# Patient Record
Sex: MALE | Race: WHITE | NOT HISPANIC OR LATINO | Employment: UNEMPLOYED | URBAN - METROPOLITAN AREA
[De-identification: names, ages, dates, MRNs, and addresses within clinical notes are randomized per-mention and may not be internally consistent; named-entity substitution may affect disease eponyms.]

---

## 2021-06-29 NOTE — TELEPHONE ENCOUNTER
Patient requesting Zolpidem 12 5 mg  States last time he had it was 5/26/21  States unable to sleep well for last 3-4 days  Will like request call back at number rakel

## 2021-12-03 ENCOUNTER — OFFICE VISIT (OUTPATIENT)
Dept: FAMILY MEDICINE CLINIC | Facility: CLINIC | Age: 36
End: 2021-12-03
Payer: COMMERCIAL

## 2021-12-03 VITALS
BODY MASS INDEX: 27.4 KG/M2 | DIASTOLIC BLOOD PRESSURE: 80 MMHG | HEART RATE: 115 BPM | SYSTOLIC BLOOD PRESSURE: 124 MMHG | TEMPERATURE: 97.6 F | OXYGEN SATURATION: 95 % | HEIGHT: 69 IN | WEIGHT: 185 LBS

## 2021-12-03 DIAGNOSIS — Z11.4 ENCOUNTER FOR SCREENING FOR HIV: ICD-10-CM

## 2021-12-03 DIAGNOSIS — F79 INTELLECTUAL DISABILITY: Primary | ICD-10-CM

## 2021-12-03 DIAGNOSIS — G47.9 SLEEP DISTURBANCE: ICD-10-CM

## 2021-12-03 DIAGNOSIS — F32.A DEPRESSION, UNSPECIFIED DEPRESSION TYPE: ICD-10-CM

## 2021-12-03 DIAGNOSIS — Z23 ENCOUNTER FOR IMMUNIZATION: ICD-10-CM

## 2021-12-03 PROCEDURE — 3008F BODY MASS INDEX DOCD: CPT | Performed by: FAMILY MEDICINE

## 2021-12-03 PROCEDURE — 1036F TOBACCO NON-USER: CPT | Performed by: FAMILY MEDICINE

## 2021-12-03 PROCEDURE — 90686 IIV4 VACC NO PRSV 0.5 ML IM: CPT | Performed by: FAMILY MEDICINE

## 2021-12-03 PROCEDURE — G0008 ADMIN INFLUENZA VIRUS VAC: HCPCS | Performed by: FAMILY MEDICINE

## 2021-12-03 PROCEDURE — 99214 OFFICE O/P EST MOD 30 MIN: CPT | Performed by: FAMILY MEDICINE

## 2021-12-03 PROCEDURE — 3725F SCREEN DEPRESSION PERFORMED: CPT | Performed by: FAMILY MEDICINE

## 2021-12-03 RX ORDER — ZOLPIDEM TARTRATE 10 MG/1
TABLET ORAL
COMMUNITY
Start: 2021-10-25 | End: 2021-12-03 | Stop reason: SDUPTHER

## 2021-12-03 RX ORDER — CITALOPRAM 20 MG/1
20 TABLET ORAL DAILY
Qty: 30 TABLET | Refills: 5 | Status: SHIPPED | OUTPATIENT
Start: 2021-12-03

## 2021-12-03 RX ORDER — ZOLPIDEM TARTRATE 10 MG/1
10 TABLET ORAL
Qty: 30 TABLET | Refills: 0 | Status: SHIPPED | OUTPATIENT
Start: 2021-12-03 | End: 2022-01-03 | Stop reason: SDUPTHER

## 2021-12-03 RX ORDER — ZOLPIDEM TARTRATE 10 MG/1
TABLET ORAL
Qty: 30 TABLET | Status: CANCELLED | OUTPATIENT
Start: 2021-12-03

## 2021-12-03 RX ORDER — CITALOPRAM 20 MG/1
20 TABLET ORAL DAILY
COMMUNITY
Start: 2021-11-29 | End: 2021-12-03 | Stop reason: SDUPTHER

## 2021-12-07 ENCOUNTER — APPOINTMENT (OUTPATIENT)
Dept: LAB | Facility: CLINIC | Age: 36
End: 2021-12-07
Payer: COMMERCIAL

## 2021-12-07 DIAGNOSIS — F79 INTELLECTUAL DISABILITY: ICD-10-CM

## 2021-12-07 DIAGNOSIS — G47.9 SLEEP DISTURBANCE: ICD-10-CM

## 2021-12-07 DIAGNOSIS — Z11.4 ENCOUNTER FOR SCREENING FOR HIV: ICD-10-CM

## 2021-12-07 DIAGNOSIS — F32.A DEPRESSION, UNSPECIFIED DEPRESSION TYPE: ICD-10-CM

## 2021-12-07 LAB
ALBUMIN SERPL BCP-MCNC: 4 G/DL (ref 3.5–5)
ALP SERPL-CCNC: 81 U/L (ref 46–116)
ALT SERPL W P-5'-P-CCNC: 33 U/L (ref 12–78)
ANION GAP SERPL CALCULATED.3IONS-SCNC: 7 MMOL/L (ref 4–13)
AST SERPL W P-5'-P-CCNC: 9 U/L (ref 5–45)
BASOPHILS # BLD AUTO: 0.07 THOUSANDS/ΜL (ref 0–0.1)
BASOPHILS NFR BLD AUTO: 1 % (ref 0–1)
BILIRUB SERPL-MCNC: 0.45 MG/DL (ref 0.2–1)
BUN SERPL-MCNC: 13 MG/DL (ref 5–25)
CALCIUM SERPL-MCNC: 9.8 MG/DL (ref 8.3–10.1)
CHLORIDE SERPL-SCNC: 105 MMOL/L (ref 100–108)
CHOLEST SERPL-MCNC: 250 MG/DL
CO2 SERPL-SCNC: 25 MMOL/L (ref 21–32)
CREAT SERPL-MCNC: 1.14 MG/DL (ref 0.6–1.3)
EOSINOPHIL # BLD AUTO: 0.05 THOUSAND/ΜL (ref 0–0.61)
EOSINOPHIL NFR BLD AUTO: 1 % (ref 0–6)
GFR SERPL CREATININE-BSD FRML MDRD: 82 ML/MIN/1.73SQ M
GLUCOSE P FAST SERPL-MCNC: 70 MG/DL (ref 65–99)
HCT VFR BLD AUTO: 47.9 % (ref 36.5–49.3)
HDLC SERPL-MCNC: 32 MG/DL
HGB BLD-MCNC: 15.6 G/DL (ref 12–17)
LDLC SERPL CALC-MCNC: 174 MG/DL (ref 0–100)
LYMPHOCYTES # BLD AUTO: 1.08 THOUSANDS/ΜL (ref 0.6–4.47)
LYMPHOCYTES NFR BLD AUTO: 17 % (ref 14–44)
MCH RBC QN AUTO: 29.1 PG (ref 26.8–34.3)
MCHC RBC AUTO-ENTMCNC: 32.6 G/DL (ref 31.4–37.4)
MCV RBC AUTO: 89 FL (ref 82–98)
MONOCYTES # BLD AUTO: 0.51 THOUSAND/ΜL (ref 0.17–1.22)
MONOCYTES NFR BLD AUTO: 8 % (ref 4–12)
NEUTROPHILS # BLD AUTO: 4.5 THOUSANDS/ΜL (ref 1.85–7.62)
NEUTS SEG NFR BLD AUTO: 72 % (ref 43–75)
NONHDLC SERPL-MCNC: 218 MG/DL
PLATELET # BLD AUTO: 323 THOUSANDS/UL (ref 149–390)
POTASSIUM SERPL-SCNC: 3.9 MMOL/L (ref 3.5–5.3)
PROT SERPL-MCNC: 8.1 G/DL (ref 6.4–8.2)
RBC # BLD AUTO: 5.36 MILLION/UL (ref 3.88–5.62)
SODIUM SERPL-SCNC: 137 MMOL/L (ref 136–145)
TRIGL SERPL-MCNC: 221 MG/DL
TSH SERPL DL<=0.05 MIU/L-ACNC: 1.13 UIU/ML (ref 0.36–3.74)
WBC # BLD AUTO: 6.23 THOUSAND/UL (ref 4.31–10.16)

## 2021-12-07 PROCEDURE — 80053 COMPREHEN METABOLIC PANEL: CPT

## 2021-12-07 PROCEDURE — 85025 COMPLETE CBC W/AUTO DIFF WBC: CPT

## 2021-12-07 PROCEDURE — 87389 HIV-1 AG W/HIV-1&-2 AB AG IA: CPT

## 2021-12-07 PROCEDURE — 84443 ASSAY THYROID STIM HORMONE: CPT

## 2021-12-07 PROCEDURE — 80061 LIPID PANEL: CPT

## 2021-12-07 PROCEDURE — 36415 COLL VENOUS BLD VENIPUNCTURE: CPT

## 2021-12-08 LAB — HIV 1+2 AB+HIV1 P24 AG SERPL QL IA: NORMAL

## 2022-01-03 ENCOUNTER — OFFICE VISIT (OUTPATIENT)
Dept: FAMILY MEDICINE CLINIC | Facility: CLINIC | Age: 37
End: 2022-01-03
Payer: MEDICARE

## 2022-01-03 VITALS
SYSTOLIC BLOOD PRESSURE: 128 MMHG | OXYGEN SATURATION: 97 % | HEIGHT: 70 IN | DIASTOLIC BLOOD PRESSURE: 80 MMHG | TEMPERATURE: 98.9 F | HEART RATE: 110 BPM | WEIGHT: 191.4 LBS | BODY MASS INDEX: 27.4 KG/M2

## 2022-01-03 DIAGNOSIS — G47.9 SLEEP DISTURBANCE: ICD-10-CM

## 2022-01-03 DIAGNOSIS — E78.2 MODERATE MIXED HYPERLIPIDEMIA NOT REQUIRING STATIN THERAPY: ICD-10-CM

## 2022-01-03 DIAGNOSIS — K21.9 GASTROESOPHAGEAL REFLUX DISEASE, UNSPECIFIED WHETHER ESOPHAGITIS PRESENT: Primary | ICD-10-CM

## 2022-01-03 PROBLEM — E78.5 HYPERLIPEMIA: Status: ACTIVE | Noted: 2022-01-03

## 2022-01-03 PROCEDURE — 3008F BODY MASS INDEX DOCD: CPT | Performed by: FAMILY MEDICINE

## 2022-01-03 PROCEDURE — 99213 OFFICE O/P EST LOW 20 MIN: CPT | Performed by: FAMILY MEDICINE

## 2022-01-03 RX ORDER — ZOLPIDEM TARTRATE 10 MG/1
10 TABLET ORAL
Qty: 30 TABLET | Refills: 0 | Status: SHIPPED | OUTPATIENT
Start: 2022-01-03 | End: 2022-01-31 | Stop reason: SDUPTHER

## 2022-01-03 RX ORDER — OMEPRAZOLE 20 MG/1
20 CAPSULE, DELAYED RELEASE ORAL
Qty: 90 CAPSULE | Refills: 3 | Status: SHIPPED | OUTPATIENT
Start: 2022-01-03

## 2022-01-03 NOTE — PROGRESS NOTES
Assessment/Plan:   1  Gastroesophageal reflux disease, unspecified whether esophagitis present  Assessment & Plan:  Start patient on omeprazole 20 mg daily  Discussed decreasing caffeine and acidic food intake  Reviewed food journal with patient  Orders:  -     omeprazole (PriLOSEC) 20 mg delayed release capsule; Take 1 capsule (20 mg total) by mouth daily before breakfast    2  Moderate mixed hyperlipidemia not requiring statin therapy  Assessment & Plan:  Elevated LDL and Triglycerides  Repeat Lipid panel in 6 months   Reviewed Lifestyle modifications with patient  3  Sleep disturbance  Assessment & Plan:  Chronic sleep disturbance  Discussed sleep hygiene  Has been on Ambien for 15 years  Controlled Substance [place]  [place] or [place]  reviewed: No red flags were identified; safe to proceed with prescription             Nutrition Assessment and Intervention:     Reviewed food recall journal      Physical Activity Assessment and Intervention:    Activity journal reviewed      Emotional and Mental Well-being, Sleep, Connectedness Assessment and Intervention:    Counseled regarding sleep hygiene and aspects of healthy sleep      Therapeutic Lifestyle Change Visit:     One-on-one comprehensive counseling, coaching, and health behavior change visit completed              Return in about 8 weeks (around 2/28/2022) for Recheck  Subjective:    PAM Ayon is a 39 y o  male who presents with:        ---Above per clinical staff & reviewed  ---      Patient reports indigestion when eating tomato sauce do a dot candy as well  Notices the most add because nighttime  Has tried baking soda but this has not helped  Feels pressure in his chest when lying down  Denies any other chest pain  Denies sore throat or cough  Denies diarrhea nausea, vomiting  No blood in his stool    Otherwise doing well      The following portions of the patient's history were reviewed and updated as appropriate: allergies, current medications, past family history, past medical history, past social history, past surgical history and problem list     Review of Systems   Constitutional: Negative for appetite change, fatigue and fever  Indigestion      HENT: Negative for sore throat  Eyes: Negative for visual disturbance  Respiratory: Negative for cough, chest tightness and shortness of breath  Cardiovascular: Negative for chest pain, palpitations and leg swelling  Gastrointestinal: Negative for abdominal pain, anal bleeding, blood in stool, constipation, diarrhea and nausea  Endocrine: Negative for cold intolerance and heat intolerance  Genitourinary: Negative for flank pain  Musculoskeletal: Negative for back pain and neck pain  Skin: Negative for rash  Neurological: Negative for headaches  Psychiatric/Behavioral: Negative for behavioral problems and confusion  Objective:    /80 (BP Location: Left arm, Patient Position: Sitting, Cuff Size: Large)   Pulse (!) 110   Temp 98 9 °F (37 2 °C)   Ht 5' 10" (1 778 m)   Wt 86 8 kg (191 lb 6 4 oz)   SpO2 97%   BMI 27 46 kg/m²   Wt Readings from Last 3 Encounters:   01/03/22 86 8 kg (191 lb 6 4 oz)   12/03/21 83 9 kg (185 lb)     BP Readings from Last 3 Encounters:   01/03/22 128/80   12/03/21 124/80     Current Outpatient Medications   Medication Sig Dispense Refill    citalopram (CeleXA) 20 mg tablet Take 1 tablet (20 mg total) by mouth daily 30 tablet 5    zolpidem (AMBIEN) 10 mg tablet Take 1 tablet (10 mg total) by mouth daily at bedtime as needed for sleep 30 tablet 0     No current facility-administered medications for this visit  Physical Exam  Vitals and nursing note reviewed  Constitutional:       Appearance: He is well-developed  HENT:      Head: Normocephalic and atraumatic  Nose: Nose normal    Eyes:      Extraocular Movements: Extraocular movements intact  Pupils: Pupils are equal, round, and reactive to light  Cardiovascular:      Rate and Rhythm: Normal rate and regular rhythm  Heart sounds: Normal heart sounds  No murmur heard  Pulmonary:      Effort: Pulmonary effort is normal       Breath sounds: Normal breath sounds  Abdominal:      General: Bowel sounds are normal       Palpations: Abdomen is soft  Musculoskeletal:         General: Normal range of motion  Cervical back: Normal range of motion and neck supple  Skin:     General: Skin is warm and dry  Neurological:      Mental Status: He is alert and oriented to person, place, and time     Psychiatric:         Behavior: Behavior normal

## 2022-01-03 NOTE — PATIENT INSTRUCTIONS
GERD (Gastroesophageal Reflux Disease)   WHAT YOU NEED TO KNOW:   Gastroesophageal reflux disease (GERD) is reflux that occurs more than twice a week for a few weeks  Reflux means acid and food in the stomach back up into the esophagus  It usually causes heartburn and other symptoms  GERD can cause other health problems over time if it is not treated  DISCHARGE INSTRUCTIONS:   Call your local emergency number (911 in the [address] [facility]) if:   · You have severe chest pain and sudden trouble breathing  Return to the emergency department if:   · You have trouble breathing after you vomit  · You have trouble swallowing, or pain with swallowing  · Your bowel movements are black, bloody, or tarry-looking  · Your vomit looks like coffee grounds or has blood in it  Call your doctor or gastroenterologist if:   · You feel full and cannot burp or vomit  · You vomit large amounts, or you vomit often  · You are losing weight without trying  · Your symptoms get worse or do not improve with treatment  · You have questions or concerns about your condition or care  Medicines:   · Medicines  are used to decrease stomach acid  Medicine may also be used to help your lower esophageal sphincter and stomach contract (tighten) more  · Take your medicine as directed  Contact your healthcare provider if you think your medicine is not helping or if you have side effects  Tell him of her if you are allergic to any medicine  Keep a list of the medicines, vitamins, and herbs you take  Include the amounts, and when and why you take them  Bring the list or the pill bottles to follow-up visits  Carry your medicine list with you in case of an emergency  Manage GERD:       · Do not have foods or drinks that may increase heartburn  These include chocolate, peppermint, fried or fatty foods, drinks that contain caffeine, or carbonated drinks (soda)   Other foods include spicy foods, onions, tomatoes, and tomato-based foods  Do not have foods or drinks that can irritate your esophagus, such as citrus fruits, juices, and alcohol  · Do not eat large meals  When you eat a lot of food at one time, your stomach needs more acid to digest it  Eat 6 small meals each day instead of 3 large ones, and eat slowly  Do not eat meals 2 to 3 hours before bedtime  · Elevate the head of your bed  Place 6-inch blocks under the head of your bed frame  You may also use more than one pillow under your head and shoulders while you sleep  · Maintain a healthy weight  If you are overweight, weight loss may help relieve symptoms of GERD  · Do not smoke  Smoking weakens the lower esophageal sphincter and increases the risk of GERD  Ask your healthcare provider for information if you currently smoke and need help to quit  E-cigarettes or smokeless tobacco still contain nicotine  Talk to your healthcare provider before you use these products  · Do not wear clothing that is tight around your waist   Tight clothing can put pressure on your stomach and cause or worsen GERD symptoms  Follow up with your doctor or gastroenterologist as directed:  Write down your questions so you remember to ask them during your visits  © Copyright [employer] 2021 Information is for End User's use only and may not be sold, redistributed or otherwise used for commercial purposes  All illustrations and images included in CareNotes® are the copyrighted property of [employer]  or Juliane Cruz  The above information is an  only  It is not intended as medical advice for individual conditions or treatments  Talk to your doctor, nurse or pharmacist before following any medical regimen to see if it is safe and effective for you

## 2022-01-03 NOTE — ASSESSMENT & PLAN NOTE
Elevated LDL and Triglycerides  Repeat Lipid panel in 6 months   Reviewed Lifestyle modifications with patient

## 2022-01-03 NOTE — ASSESSMENT & PLAN NOTE
Start patient on omeprazole 20 mg daily  Discussed decreasing caffeine and acidic food intake  Reviewed food journal with patient

## 2022-01-31 DIAGNOSIS — G47.9 SLEEP DISTURBANCE: ICD-10-CM

## 2022-02-02 RX ORDER — ZOLPIDEM TARTRATE 10 MG/1
10 TABLET ORAL
Qty: 30 TABLET | Refills: 0 | Status: SHIPPED | OUTPATIENT
Start: 2022-02-02 | End: 2022-03-01 | Stop reason: SDUPTHER

## 2022-03-01 DIAGNOSIS — G47.9 SLEEP DISTURBANCE: ICD-10-CM

## 2022-03-01 NOTE — TELEPHONE ENCOUNTER
Patient requesting refill on the attached medication  Patient only has 4 pills left  Please advise  Thank you

## 2022-03-03 RX ORDER — ZOLPIDEM TARTRATE 10 MG/1
10 TABLET ORAL
Qty: 30 TABLET | Refills: 0 | Status: SHIPPED | OUTPATIENT
Start: 2022-03-03 | End: 2022-04-05

## 2022-03-11 ENCOUNTER — OFFICE VISIT (OUTPATIENT)
Dept: FAMILY MEDICINE CLINIC | Facility: CLINIC | Age: 37
End: 2022-03-11
Payer: MEDICARE

## 2022-03-11 VITALS
SYSTOLIC BLOOD PRESSURE: 120 MMHG | HEART RATE: 100 BPM | TEMPERATURE: 97.9 F | RESPIRATION RATE: 20 BRPM | OXYGEN SATURATION: 98 % | BODY MASS INDEX: 26.76 KG/M2 | DIASTOLIC BLOOD PRESSURE: 84 MMHG | WEIGHT: 186.5 LBS

## 2022-03-11 DIAGNOSIS — K21.9 GASTROESOPHAGEAL REFLUX DISEASE, UNSPECIFIED WHETHER ESOPHAGITIS PRESENT: Primary | ICD-10-CM

## 2022-03-11 PROCEDURE — 99213 OFFICE O/P EST LOW 20 MIN: CPT | Performed by: FAMILY MEDICINE

## 2022-03-11 NOTE — ASSESSMENT & PLAN NOTE
Continue decreasing caffeine and acidic foods  Trial off omeprazole  Will call office if symptoms return

## 2022-03-11 NOTE — PROGRESS NOTES
Assessment/Plan:    Gastroesophageal reflux disease  Continue decreasing caffeine and acidic foods  Trial off omeprazole  Will call office if symptoms return  Sleep disturbance  Chronic sleep disturbance  Discussed sleep hygiene  Has been on Ambien for 15 years  Cont[place] Sub[place]  [place]  reviewed: No red flags were identified; safe to proceed with prescription            Problem List Items Addressed This Visit        Digestive    Gastroesophageal reflux disease - Primary     Continue decreasing caffeine and acidic foods  Trial off omeprazole  Will call office if symptoms return  follow-up as needed  Subjective:      Patient ID: Marybeth Delarosa is a 39 y o  male  Presents for follow-up today for reflux  Tolerating omeprazole  He has also started changing his diet  Reports cutting out soda and switching to water  Has also started making dietary changes  Increasing his fruit and vegetables  Has not experienced any acid reflux recently  Has also lost 5 lb over the last 3 months  HPI    The following portions of the patient's history were reviewed and updated as appropriate: allergies, current medications, past family history, past medical history, past social history, past surgical history and problem list     Review of Systems   Constitutional: Negative for fatigue and fever  HENT: Negative for sore throat  Eyes: Negative for visual disturbance  Respiratory: Negative for cough, chest tightness and shortness of breath  Cardiovascular: Negative for chest pain, palpitations and leg swelling  Gastrointestinal: Negative for abdominal distention, abdominal pain, anal bleeding, blood in stool, constipation, diarrhea, nausea and vomiting  Endocrine: Negative for cold intolerance and heat intolerance  Genitourinary: Negative for flank pain  Musculoskeletal: Negative for back pain and neck pain  Skin: Negative for rash     Neurological: Negative for headaches  Psychiatric/Behavioral: Negative for behavioral problems  Objective:      /84 (BP Location: Left arm, Patient Position: Sitting, Cuff Size: Adult)   Pulse 100   Temp 97 9 °F (36 6 °C) (Temporal)   Resp 20   Wt 84 6 kg (186 lb 8 oz)   SpO2 98%   BMI 26 76 kg/m²          Physical Exam  Vitals and nursing note reviewed  Constitutional:       Appearance: He is well-developed  HENT:      Head: Normocephalic and atraumatic  Nose: Nose normal    Eyes:      Extraocular Movements: Extraocular movements intact  Pupils: Pupils are equal, round, and reactive to light  Cardiovascular:      Rate and Rhythm: Normal rate and regular rhythm  Heart sounds: Normal heart sounds  No murmur heard  Pulmonary:      Effort: Pulmonary effort is normal       Breath sounds: Normal breath sounds  Abdominal:      General: Bowel sounds are normal       Palpations: Abdomen is soft  Musculoskeletal:         General: Normal range of motion  Cervical back: Normal range of motion and neck supple  Skin:     General: Skin is warm and dry  Neurological:      Mental Status: He is alert and oriented to person, place, and time     Psychiatric:         Behavior: Behavior normal

## 2022-03-11 NOTE — ASSESSMENT & PLAN NOTE
Chronic sleep disturbance  Discussed sleep hygiene  Has been on Ambien for 15 years  Cont[place]  [place]  reviewed: No red flags were identified; safe to proceed with prescription  Rosina Carroll

## 2022-03-11 NOTE — PATIENT INSTRUCTIONS
GERD (Gastroesophageal Reflux Disease)   WHAT YOU NEED TO KNOW:   Gastroesophageal reflux disease (GERD) is reflux that happens more than 2 times a week for a few weeks  Reflux means acid and food in your stomach back up into your esophagus  GERD can cause other health problems over time if it is not treated  DISCHARGE INSTRUCTIONS:   Call your local emergency number (911 in the [address] [facility]) if:   · You have severe chest pain and sudden trouble breathing  Return to the emergency department if:   · You have trouble breathing after you vomit  · You have trouble swallowing, or pain with swallowing  · Your bowel movements are black, bloody, or tarry-looking  · Your vomit looks like coffee grounds or has blood in it  Call your doctor or gastroenterologist if:   · You feel full and cannot burp or vomit  · You vomit large amounts, or you vomit often  · You are losing weight without trying  · Your symptoms get worse or do not improve with treatment  · You have questions or concerns about your condition or care  Medicines:   · Medicines  are used to decrease stomach acid  Medicine may also be used to help your lower esophageal sphincter and stomach contract (tighten) more  · Take your medicine as directed  Contact your healthcare provider if you think your medicine is not helping or if you have side effects  Tell him of her if you are allergic to any medicine  Keep a list of the medicines, vitamins, and herbs you take  Include the amounts, and when and why you take them  Bring the list or the pill bottles to follow-up visits  Carry your medicine list with you in case of an emergency  Manage GERD:       · Do not have foods or drinks that may increase heartburn  These include chocolate, peppermint, fried or fatty foods, drinks that contain caffeine, or carbonated drinks (soda)  Other foods include spicy foods, onions, tomatoes, and tomato-based foods   Do not have foods or drinks that can irritate your esophagus, such as citrus fruits, juices, and alcohol  · Do not eat large meals  When you eat a lot of food at one time, your stomach needs more acid to digest it  Eat 6 small meals each day instead of 3 large ones, and eat slowly  Do not eat meals 2 to 3 hours before bedtime  · Elevate the head of your bed  Place 6-inch blocks under the head of your bed frame  You may also use more than one pillow under your head and shoulders while you sleep  · Maintain a healthy weight  If you are overweight, weight loss may help relieve symptoms of GERD  · Do not smoke  Smoking weakens the lower esophageal sphincter and increases the risk of GERD  Ask your healthcare provider for information if you currently smoke and need help to quit  E-cigarettes or smokeless tobacco still contain nicotine  Talk to your healthcare provider before you use these products  · Do not put pressure on your abdomen  Pressure pushes acid up into your esophagus  Do not wear clothing that is tight around your waist  Do not bend over  Bend at the knees if you need to pick something up  Follow up with your doctor or gastroenterologist as directed:  Write down your questions so you remember to ask them during your visits  © Copyright [employer] 2022 Information is for End User's use only and may not be sold, redistributed or otherwise used for commercial purposes  All illustrations and images included in CareNotes® are the copyrighted property of [employer]  or Juliane Cruz  The above information is an  only  It is not intended as medical advice for individual conditions or treatments  Talk to your doctor, nurse or pharmacist before following any medical regimen to see if it is safe and effective for you

## 2022-04-02 DIAGNOSIS — G47.9 SLEEP DISTURBANCE: ICD-10-CM

## 2022-04-04 ENCOUNTER — TELEPHONE (OUTPATIENT)
Dept: FAMILY MEDICINE CLINIC | Facility: CLINIC | Age: 37
End: 2022-04-04

## 2022-04-04 DIAGNOSIS — G47.9 SLEEP DISTURBANCE: ICD-10-CM

## 2022-04-04 NOTE — TELEPHONE ENCOUNTER
Patient was calling to follow up on a health call request for med refill of his zolpidiem  He is out of the med and is hoping he can get a refill today   Thank you

## 2022-04-05 RX ORDER — ZOLPIDEM TARTRATE 10 MG/1
TABLET ORAL
Qty: 30 TABLET | Refills: 2 | Status: SHIPPED | OUTPATIENT
Start: 2022-04-05 | End: 2022-06-29 | Stop reason: SDUPTHER

## 2022-04-05 RX ORDER — ZOLPIDEM TARTRATE 10 MG/1
10 TABLET ORAL
Qty: 30 TABLET | Refills: 0 | OUTPATIENT
Start: 2022-04-05

## 2022-06-29 DIAGNOSIS — G47.9 SLEEP DISTURBANCE: ICD-10-CM

## 2022-06-30 RX ORDER — ZOLPIDEM TARTRATE 10 MG/1
10 TABLET ORAL
Qty: 30 TABLET | Refills: 2 | Status: SHIPPED | OUTPATIENT
Start: 2022-06-30

## 2022-08-21 DIAGNOSIS — F32.A DEPRESSION, UNSPECIFIED DEPRESSION TYPE: ICD-10-CM

## 2022-08-22 RX ORDER — CITALOPRAM 20 MG/1
TABLET ORAL
Qty: 30 TABLET | Refills: 5 | Status: SHIPPED | OUTPATIENT
Start: 2022-08-22

## 2022-09-23 DIAGNOSIS — G47.9 SLEEP DISTURBANCE: ICD-10-CM

## 2022-09-26 RX ORDER — ZOLPIDEM TARTRATE 10 MG/1
10 TABLET ORAL
Qty: 30 TABLET | Refills: 2 | Status: SHIPPED | OUTPATIENT
Start: 2022-09-26

## 2023-01-20 ENCOUNTER — TELEPHONE (OUTPATIENT)
Dept: FAMILY MEDICINE CLINIC | Facility: CLINIC | Age: 38
End: 2023-01-20

## 2023-01-24 DIAGNOSIS — G47.9 SLEEP DISTURBANCE: Primary | ICD-10-CM

## 2023-01-24 RX ORDER — ZOLPIDEM TARTRATE 10 MG/1
10 TABLET ORAL
Qty: 30 TABLET | Refills: 2 | Status: CANCELLED | OUTPATIENT
Start: 2023-01-24

## 2023-02-20 ENCOUNTER — TELEPHONE (OUTPATIENT)
Dept: FAMILY MEDICINE CLINIC | Facility: CLINIC | Age: 38
End: 2023-02-20

## 2023-02-28 ENCOUNTER — OFFICE VISIT (OUTPATIENT)
Dept: FAMILY MEDICINE CLINIC | Facility: CLINIC | Age: 38
End: 2023-02-28

## 2023-02-28 VITALS
TEMPERATURE: 98.6 F | HEART RATE: 100 BPM | BODY MASS INDEX: 26.69 KG/M2 | DIASTOLIC BLOOD PRESSURE: 64 MMHG | WEIGHT: 180.2 LBS | HEIGHT: 69 IN | SYSTOLIC BLOOD PRESSURE: 102 MMHG | OXYGEN SATURATION: 98 %

## 2023-02-28 DIAGNOSIS — F79 INTELLECTUAL DISABILITY: Primary | ICD-10-CM

## 2023-02-28 DIAGNOSIS — G47.9 SLEEP DISTURBANCE: ICD-10-CM

## 2023-02-28 NOTE — PROGRESS NOTES
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, and friends  Likes to fish  Has not had any other vocational training since high school  Bills/payments are made by parents and siblings are close to him/in his area  Reports good family/social support  No difficulties with transportation   Recommended obtaining previous medical records in order to update his chart  Patient will follow up on his records  Will continue to monitor for any social deficits/need for social work referral   Cheryl Lombardi at Each office visit  Relevant Medications    zolpidem (AMBIEN) 10 mg tablet (Start on 3/6/2023)    Sleep disturbance     History of chronic sleep disturbance  Has been on long-term use of Zolpidem - since childhood  Patient reported that he has been placed on this current regimen after multiple other drugs failed to produced sleep/non-interrupted sleep  Denied any side effects  PDMP reviewed - no concerns  Discussed referral to behavioral health/psychiatry but patient refused  Will continue his refill and will continue to advocate for therapy/psychiatry referral           Relevant Medications    zolpidem (AMBIEN) 10 mg tablet (Start on 3/6/2023)     BMI Counseling: Body mass index is 26 61 kg/m²  The BMI is above normal  Nutrition recommendations include decreasing portion sizes, encouraging healthy choices of fruits and vegetables, decreasing fast food intake, consuming healthier snacks, limiting drinks that contain sugar, moderation in carbohydrate intake, increasing intake of lean protein, reducing intake of saturated and trans fat and reducing intake of cholesterol  Exercise recommendations include moderate physical activity 150 minutes/week, exercising 3-5 times per week and obtaining a gym membership   No pharmacotherapy was ordered  Rationale for BMI follow-up plan is due to patient being overweight or obese  Preventive health issues were discussed with patient, and age appropriate screening tests were ordered as noted in patient's After Visit Summary  Personalized health advice and appropriate referrals for health education or preventive services given if needed, as noted in patient's After Visit Summary  History of Present Illness:     Patient presents for a Medicare Wellness Visit    40-year-old male patient presents to the clinic for his annual physical   Denies any symptoms today  Reports needing refills on his Zolpidem  Denies any side effects from the medication  Reports a past medical history of unspecified learning/intellectual disability with associated sleep disturbance  Reports sleeping schedule as going to bed at 07:30/08:00 PM and will wake up at 02:30/03:00  Says when he wakes up at that time he is able to do some independent things around the house since his parents are asleep at that time  Also reports going to bed again around 06:30 to 09:30/10 AM (without another Zolpidem dose)  Says he has been on this regimen for years  Reports living at home with his mom and dad, has siblings in the area who have their own families  Reports no financial constraints  Denies any other associated symptoms  Patient Care Team:  Mary Schmitz DO as PCP - General (Family Medicine)     Review of Systems:     Review of Systems   Constitutional: Negative for activity change, appetite change and fatigue  HENT: Negative for congestion  Eyes: Negative for pain, redness and itching  Respiratory: Negative for cough, shortness of breath and wheezing  Cardiovascular: Negative for chest pain, palpitations and leg swelling  Gastrointestinal: Negative for abdominal pain, constipation, diarrhea, nausea and vomiting  Genitourinary: Negative for difficulty urinating     Musculoskeletal: Negative for back pain and neck pain  Skin: Negative for rash  Neurological: Negative for dizziness, weakness, light-headedness and headaches  Psychiatric/Behavioral: Positive for sleep disturbance  Negative for agitation, behavioral problems, confusion, decreased concentration, dysphoric mood, hallucinations, self-injury and suicidal ideas  The patient is not nervous/anxious and is not hyperactive           Problem List:     Patient Active Problem List   Diagnosis   • Intellectual disability   • BMI 27 0-27 9,adult   • Depression   • Sleep disturbance   • Encounter for immunization   • Gastroesophageal reflux disease   • Hyperlipemia      Past Medical and Surgical History:     Past Medical History:   Diagnosis Date   • Allergic    • Anxiety    • Depression    • Diverticulitis    • Diverticulitis of colon    • GERD (gastroesophageal reflux disease)    • Insomnia      Past Surgical History:   Procedure Laterality Date   • NO PAST SURGERIES        Family History:     Family History   Problem Relation Age of Onset   • Depression Mother    • Anxiety disorder Mother    • Depression Father    • Anxiety disorder Father    • Prostate cancer Father    • Cancer Father    • Cancer Paternal Grandfather       Social History:     Social History     Socioeconomic History   • Marital status: Single     Spouse name: None   • Number of children: None   • Years of education: None   • Highest education level: None   Occupational History   • None   Tobacco Use   • Smoking status: Never   • Smokeless tobacco: Never   Vaping Use   • Vaping Use: Never used   Substance and Sexual Activity   • Alcohol use: Yes     Comment: occasionally   • Drug use: Never   • Sexual activity: Yes     Birth control/protection: Condom Male   Other Topics Concern   • None   Social History Narrative   • None     Social Determinants of Health     Financial Resource Strain: Low Risk    • Difficulty of Paying Living Expenses: Not hard at all   Food Insecurity: Not on file Transportation Needs: No Transportation Needs   • Lack of Transportation (Medical): No   • Lack of Transportation (Non-Medical): No   Physical Activity: Not on file   Stress: Not on file   Social Connections: Not on file   Intimate Partner Violence: Not on file   Housing Stability: Not on file      Medications and Allergies:     Current Outpatient Medications   Medication Sig Dispense Refill   • citalopram (CeleXA) 20 mg tablet take 1 tablet by mouth once daily 30 tablet 0   • omeprazole (PriLOSEC) 20 mg delayed release capsule take 1 capsule by mouth daily before breakfast 90 capsule 3   • [START ON 3/6/2023] zolpidem (AMBIEN) 10 mg tablet Take 1 tablet (10 mg total) by mouth daily at bedtime as needed for sleep Do not start before March 6, 2023  30 tablet 0     No current facility-administered medications for this visit  No Known Allergies   Immunizations:     Immunization History   Administered Date(s) Administered   • H1N1, All Formulations 01/11/2010   • INFLUENZA 10/21/2014, 10/07/2015, 10/03/2016, 10/05/2017, 10/11/2018, 09/29/2020   • Influenza, injectable, quadrivalent, preservative free 0 5 mL 12/03/2021   • Tdap 10/05/2017      Health Maintenance:         Topic Date Due   • Hepatitis C Screening  Never done   • HIV Screening  Completed         Topic Date Due   • COVID-19 Vaccine (1) Never done   • Influenza Vaccine (1) 09/01/2022      Medicare Screening Tests and Risk Assessments:     Nany is here for his Subsequent Wellness visit  Health Risk Assessment:   Patient rates overall health as excellent  Patient feels that their physical health rating is much better  Patient is very satisfied with their life  Eyesight was rated as same  Hearing was rated as same  Patient feels that their emotional and mental health rating is much better  Patients states they are never, rarely angry  Patient states they are never, rarely unusually tired/fatigued  Pain experienced in the last 7 days has been none  Patient states that he has experienced no weight loss or gain in last 6 months  Fall Risk Screening: In the past year, patient has experienced: no history of falling in past year      Home Safety:  Patient does not have trouble with stairs inside or outside of their home  Patient has no working smoke alarms and has no working carbon monoxide detector  Home safety hazards include: none  Nutrition:   Current diet is Regular  Medications:   Patient is not currently taking any over-the-counter supplements  Patient is able to manage medications  Activities of Daily Living (ADLs)/Instrumental Activities of Daily Living (IADLs):   Walk and transfer into and out of bed and chair?: Yes  Dress and groom yourself?: Yes    Bathe or shower yourself?: Yes    Feed yourself? Yes  Do your laundry/housekeeping?: Yes  Manage your money, pay your bills and track your expenses?: Yes  Make your own meals?: Yes    Do your own shopping?: Yes    Previous Hospitalizations:   Any hospitalizations or ED visits within the last 12 months?: No      PREVENTIVE SCREENINGS      Cardiovascular Screening:    General: Screening Not Indicated and History Lipid Disorder      Prostate Cancer Screening:    General: Screening Not Indicated      Lung Cancer Screening:     General: Screening Not Indicated    Screening, Brief Intervention, and Referral to Treatment (SBIRT)    Screening      AUDIT-C Screenin) How often did you have a drink containing alcohol in the past year? monthly or less  2) How many drinks did you have on a typical day when you were drinking in the past year?  1 to 2  3) How often did you have 6 or more drinks on one occasion in the past year? never    AUDIT-C Score: 1  Interpretation: Score 0-3 (male): Negative screen for alcohol misuse    Single Item Drug Screening:  How often have you used an illegal drug (including marijuana) or a prescription medication for non-medical reasons in the past year? never    Single Item Drug Screen Score: 0  Interpretation: Negative screen for possible drug use disorder    No results found  Physical Exam:     /64   Pulse 100   Temp 98 6 °F (37 °C) (Temporal)   Ht 5' 9" (1 753 m)   Wt 81 7 kg (180 lb 3 2 oz)   SpO2 98%   BMI 26 61 kg/m²     Physical Exam  Vitals reviewed  Constitutional:       General: He is not in acute distress  Appearance: Normal appearance  He is not ill-appearing, toxic-appearing or diaphoretic  HENT:      Head: Normocephalic and atraumatic  Right Ear: Tympanic membrane, ear canal and external ear normal       Left Ear: Tympanic membrane, ear canal and external ear normal       Nose: Nose normal  No congestion or rhinorrhea  Mouth/Throat:      Mouth: Mucous membranes are moist       Pharynx: Oropharynx is clear  No oropharyngeal exudate or posterior oropharyngeal erythema  Eyes:      General: No scleral icterus  Right eye: No discharge  Left eye: No discharge  Extraocular Movements: Extraocular movements intact  Conjunctiva/sclera: Conjunctivae normal       Pupils: Pupils are equal, round, and reactive to light  Neck:      Vascular: No carotid bruit  Cardiovascular:      Rate and Rhythm: Normal rate and regular rhythm  Pulses: Normal pulses  Heart sounds: Normal heart sounds  No murmur heard  No friction rub  No gallop  Pulmonary:      Effort: Pulmonary effort is normal       Breath sounds: Normal breath sounds  No wheezing  Abdominal:      General: Abdomen is flat  Bowel sounds are normal       Palpations: Abdomen is soft  Tenderness: There is no right CVA tenderness or left CVA tenderness  Musculoskeletal:         General: Normal range of motion  Cervical back: Normal range of motion and neck supple  No rigidity or tenderness  Right lower leg: No edema  Left lower leg: Edema present  Lymphadenopathy:      Cervical: No cervical adenopathy     Skin:     General: Skin is warm       Capillary Refill: Capillary refill takes less than 2 seconds  Findings: No rash  Neurological:      General: No focal deficit present  Mental Status: He is alert  Cranial Nerves: No cranial nerve deficit  Motor: No weakness        Gait: Gait normal    Psychiatric:         Mood and Affect: Mood normal          Behavior: Behavior normal           Ilir Moreno DO

## 2023-03-01 RX ORDER — ZOLPIDEM TARTRATE 10 MG/1
10 TABLET ORAL
Qty: 30 TABLET | Refills: 0 | Status: SHIPPED | OUTPATIENT
Start: 2023-03-06

## 2023-03-01 RX ORDER — ZOLPIDEM TARTRATE 10 MG/1
10 TABLET ORAL
Qty: 30 TABLET | Refills: 0 | Status: SHIPPED | OUTPATIENT
Start: 2023-03-22 | End: 2023-03-01 | Stop reason: SDUPTHER

## 2023-03-03 NOTE — ASSESSMENT & PLAN NOTE
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

## 2023-03-03 NOTE — ASSESSMENT & PLAN NOTE
History of chronic sleep disturbance  Has been on long-term use of Zolpidem - since childhood  Patient reported that he has been placed on this current regimen after multiple other drugs failed to produced sleep/non-interrupted sleep  Denied any side effects  PDMP reviewed - no concerns  Discussed referral to behavioral health/psychiatry but patient refused    Will continue his refill and will continue to advocate for therapy/psychiatry referral

## 2023-03-19 DIAGNOSIS — F32.A DEPRESSION, UNSPECIFIED DEPRESSION TYPE: ICD-10-CM

## 2023-03-20 RX ORDER — CITALOPRAM 20 MG/1
TABLET ORAL
Qty: 30 TABLET | Refills: 0 | Status: SHIPPED | OUTPATIENT
Start: 2023-03-20

## 2023-03-28 ENCOUNTER — OFFICE VISIT (OUTPATIENT)
Dept: FAMILY MEDICINE CLINIC | Facility: CLINIC | Age: 38
End: 2023-03-28

## 2023-03-28 VITALS
BODY MASS INDEX: 26.82 KG/M2 | OXYGEN SATURATION: 98 % | HEART RATE: 86 BPM | DIASTOLIC BLOOD PRESSURE: 80 MMHG | WEIGHT: 181.6 LBS | SYSTOLIC BLOOD PRESSURE: 110 MMHG | TEMPERATURE: 98 F

## 2023-03-28 DIAGNOSIS — Z13.9 SCREENING DUE: ICD-10-CM

## 2023-03-28 DIAGNOSIS — G47.9 SLEEP DISTURBANCE: Primary | ICD-10-CM

## 2023-03-28 DIAGNOSIS — K21.9 GASTROESOPHAGEAL REFLUX DISEASE, UNSPECIFIED WHETHER ESOPHAGITIS PRESENT: ICD-10-CM

## 2023-03-28 RX ORDER — ZOLPIDEM TARTRATE 10 MG/1
10 TABLET ORAL
Qty: 30 TABLET | Refills: 0 | Status: CANCELLED | OUTPATIENT
Start: 2023-03-28

## 2023-03-28 RX ORDER — OMEPRAZOLE 20 MG/1
20 CAPSULE, DELAYED RELEASE ORAL
Qty: 90 CAPSULE | Refills: 3 | Status: SHIPPED | OUTPATIENT
Start: 2023-03-28

## 2023-03-28 NOTE — PROGRESS NOTES
Family Medicine Follow-Up Office Visit  C[address] 40 y o  male   MRN: [id number] : 1985  ENCOUNTER: 3/31/2023 4:09 AM    Assessment and Plan   Gastroesophageal reflux disease  Discussed identifying triggers with his GERD  Denies any flare-ups when using Prilosec  - Continue Prilosec/omeprazzole 20 mg PO daily before breakfast  - Monitor for flare-up and identify possible triggers  - Reassess at next office visit     Sleep disturbance  Chronic sleep disturbance controlled on Ambien for over 15 + years  Denied any side-effects and denied taking medication with any illicit substance nor any alcohol  PDMP reviewed - no red flags identified; safe to proceed with medication      - Continue Zolpidem/Ambien 10 mg PO at night PRN   - Reassess at next office visit     Screening due  Follow up CMP and Lipid panel       Chief Complaint     Chief Complaint   Patient presents with   • Follow-up       History of Present Illness   Gladys Calzada is a 40y o -year-old male who presents today for follow up on his sleeping disorder  He reports no side-effects from use and says his sleep is still the same on most days as described at last office visit  Says attempted new routine and is adjusting to it  Reports needing refills other wise he denies any other medical problems today  Review of Systems   Review of Systems   Constitutional: Negative for fatigue  HENT: Negative for congestion  Eyes: Negative for visual disturbance  Respiratory: Negative for shortness of breath and wheezing  Cardiovascular: Negative for chest pain and palpitations  Gastrointestinal: Negative for constipation, diarrhea, nausea and vomiting  Genitourinary: Negative for dysuria  Musculoskeletal: Negative for back pain and neck pain  Neurological: Negative for dizziness, light-headedness and headaches  Psychiatric/Behavioral: Negative for dysphoric mood         Active Problem List     Patient Active Problem List Diagnosis   • Intellectual disability   • BMI 27 0-27 9,adult   • Depression   • Sleep disturbance   • Encounter for immunization   • Gastroesophageal reflux disease   • Hyperlipemia   • Screening due       Past Medical History, Past Surgical History, Family History, and Social History were reviewed and updated today as appropriate  Objective   /80 (BP Location: Left arm, Patient Position: Sitting, Cuff Size: Large)   Pulse 86   Temp 98 °F (36 7 °C) (Tympanic)   Wt 82 4 kg (181 lb 9 6 oz)   SpO2 98%   BMI 26 82 kg/m²     Physical Exam  Vitals and nursing note reviewed  Constitutional:       General: He is not in acute distress  Appearance: Normal appearance  HENT:      Head: Normocephalic and atraumatic  Right Ear: External ear normal       Left Ear: External ear normal       Nose: Nose normal       Mouth/Throat:      Mouth: Mucous membranes are moist       Pharynx: No oropharyngeal exudate or posterior oropharyngeal erythema  Eyes:      General: No scleral icterus  Right eye: No discharge  Left eye: No discharge  Extraocular Movements: Extraocular movements intact  Conjunctiva/sclera: Conjunctivae normal    Cardiovascular:      Rate and Rhythm: Normal rate and regular rhythm  Pulses: Normal pulses  Heart sounds: Normal heart sounds  No murmur heard  Pulmonary:      Effort: Pulmonary effort is normal       Breath sounds: Normal breath sounds  Abdominal:      General: Abdomen is flat  Bowel sounds are normal       Tenderness: There is no abdominal tenderness  Musculoskeletal:         General: Normal range of motion  Cervical back: Normal range of motion and neck supple  Right lower leg: No edema  Left lower leg: No edema  Skin:     General: Skin is warm and dry  Capillary Refill: Capillary refill takes less than 2 seconds  Findings: No rash  Neurological:      General: No focal deficit present        Mental Status: He is alert and oriented to person, place, and time     Psychiatric:         Mood and Affect: Mood normal          Behavior: Behavior normal            Pertinent Laboratory/Diagnostic Studies:  Lab Results   Component Value Date    BUN 13 12/07/2021    CREATININE 1 14 12/07/2021    CALCIUM 9 8 12/07/2021    K 3 9 12/07/2021    CO2 25 12/07/2021     12/07/2021     Lab Results   Component Value Date    ALT 33 12/07/2021    AST 9 12/07/2021    ALKPHOS 81 12/07/2021       Lab Results   Component Value Date    WBC 6 23 12/07/2021    HGB 15 6 12/07/2021    HCT 47 9 12/07/2021    MCV 89 12/07/2021     12/07/2021       No results found for: TSH    No results found for: CHOL  Lab Results   Component Value Date    TRIG 221 (H) 12/07/2021     Lab Results   Component Value Date    HDL 32 (L) 12/07/2021     Lab Results   Component Value Date    LDLCALC 174 (H) 12/07/2021     No results found for: HGBA1C    Results for orders placed or performed in visit on 12/07/21   CBC and differential   Result Value Ref Range    WBC 6 23 4 31 - 10 16 Thousand/uL    RBC 5 36 3 88 - 5 62 Million/uL    Hemoglobin 15 6 12 0 - 17 0 g/dL    Hematocrit 47 9 36 5 - 49 3 %    MCV 89 82 - 98 fL    MCH 29 1 26 8 - 34 3 pg    MCHC 32 6 31 4 - 37 4 g/dL    Platelets 473 361 - 490 Thousands/uL    Neutrophils Relative 72 43 - 75 %    Lymphocytes Relative 17 14 - 44 %    Monocytes Relative 8 4 - 12 %    Eosinophils Relative 1 0 - 6 %    Basophils Relative 1 0 - 1 %    Neutrophils Absolute 4 50 1 85 - 7 62 Thousands/µL    Lymphocytes Absolute 1 08 0 60 - 4 47 Thousands/µL    Monocytes Absolute 0 51 0 17 - 1 22 Thousand/µL    Eosinophils Absolute 0 05 0 00 - 0 61 Thousand/µL    Basophils Absolute 0 07 0 00 - 0 10 Thousands/µL   Comprehensive metabolic panel   Result Value Ref Range    Sodium 137 136 - 145 mmol/L    Potassium 3 9 3 5 - 5 3 mmol/L    Chloride 105 100 - 108 mmol/L    CO2 25 21 - 32 mmol/L    ANION GAP 7 4 - 13 mmol/L    BUN 13 5 - 25 mg/dL    Creatinine 1 14 0 60 - 1 30 mg/dL    Glucose, Fasting 70 65 - 99 mg/dL    Calcium 9 8 8 3 - 10 1 mg/dL    AST 9 5 - 45 U/L    ALT 33 12 - 78 U/L    Alkaline Phosphatase 81 46 - 116 U/L    Total Protein 8 1 6 4 - 8 2 g/dL    Albumin 4 0 3 5 - 5 0 g/dL    Total Bilirubin 0 45 0 20 - 1 00 mg/dL    eGFR 82 ml/min/1 73sq m   Lipid panel   Result Value Ref Range    Cholesterol 250 (H) See Comment mg/dL    Triglycerides 221 (H) See Comment mg/dL    HDL, Direct 32 (L) >=40 mg/dL    LDL Calculated 174 (H) 0 - 100 mg/dL    Non-HDL-Chol (CHOL-HDL) 218 mg/dl   TSH, 3rd generation with Free T4 reflex   Result Value Ref Range    TSH 3RD GENERATON 1 130 0 358 - 3 740 uIU/mL   HIV 1/2 ANTIGEN/ANTIBODY (4TH GENERATION) W REFLEX [facility]   Result Value Ref Range    HIV-1/HIV-2 Ab Non-Reactive Non-Reactive       Orders Placed This Encounter   Procedures   • Comprehensive metabolic panel   • Lipid Panel with Direct LDL reflex         Current Medications     Current Outpatient Medications   Medication Sig Dispense Refill   • citalopram (CeleXA) 20 mg tablet take 1 tablet by mouth once daily 30 tablet 0   • omeprazole (PriLOSEC) 20 mg delayed release capsule Take 1 capsule (20 mg total) by mouth daily before breakfast 90 capsule 3   • zolpidem (AMBIEN) 10 mg tablet Take 1 tablet (10 mg total) by mouth daily at bedtime as needed for sleep Do not start before March 6, 2023  30 tablet 0     No current facility-administered medications for this visit         ALLERGIES:  No Known Allergies    Health Maintenance     Health Maintenance   Topic Date Due   • Hepatitis C Screening  Never done   • Medicare Annual Wellness Visit (AWV)  Never done   • COVID-19 Vaccine (1) Never done   • Influenza Vaccine (1) 09/01/2022   • BMI: Followup Plan  03/03/2024   • BMI: Adult  03/28/2024   • HIV Screening  Completed   • Pneumococcal Vaccine: Pediatrics (0 to 5 Years) and At-Risk Patients (6 to 59 Years)  Aged Out   • HIB Vaccine  Aged Out   • IPV Vaccine  Aged Out   • Hepatitis A Vaccine  Aged Out   • Meningococcal ACWY Vaccine  Aged Out   • HPV Vaccine  Aged Out     Immunization History   Administered Date(s) Administered   • H1N1, All Formulations 01/11/2010   • INFLUENZA 10/21/2014, 10/07/2015, 10/03/2016, 10/05/2017, 10/11/2018, 09/29/2020   • Influenza, injectable, quadrivalent, preservative free 0 5 mL 12/03/2021   • Tdap 10/05/2017         Jen Ventura DO   [address]  3/31/2023  4:09 AM    Parts of this note were dictated using [employer] dictation software and may have sounds-like errors due to variation in pronunciation

## 2023-03-31 PROBLEM — Z13.9 SCREENING DUE: Status: ACTIVE | Noted: 2023-03-31

## 2023-03-31 NOTE — ASSESSMENT & PLAN NOTE
Chronic sleep disturbance controlled on Ambien for over 15 + years  Denied any side-effects and denied taking medication with any illicit substance nor any alcohol      PDMP reviewed - no red flags identified; safe to proceed with medication      - Continue Zolpidem/Ambien 10 mg PO at night PRN   - Reassess at next office visit

## 2023-03-31 NOTE — ASSESSMENT & PLAN NOTE
Discussed identifying triggers with his GERD  Denies any flare-ups when using Prilosec      - Continue Prilosec/omeprazzole 20 mg PO daily before breakfast  - Monitor for flare-up and identify possible triggers  - Reassess at next office visit

## 2023-04-05 DIAGNOSIS — G47.9 SLEEP DISTURBANCE: ICD-10-CM

## 2023-04-05 RX ORDER — ZOLPIDEM TARTRATE 10 MG/1
10 TABLET ORAL
Qty: 30 TABLET | Refills: 0 | Status: CANCELLED | OUTPATIENT
Start: 2023-04-05

## 2023-04-07 DIAGNOSIS — G47.9 SLEEP DISTURBANCE: ICD-10-CM

## 2023-04-07 RX ORDER — ZOLPIDEM TARTRATE 10 MG/1
10 TABLET ORAL
Qty: 30 TABLET | Refills: 0 | Status: SHIPPED | OUTPATIENT
Start: 2023-04-07

## 2023-04-07 NOTE — TELEPHONE ENCOUNTER
Patient called in states he had only on pill left of his Ambien 10 mg Please review for refill thank you

## 2023-04-22 DIAGNOSIS — F32.A DEPRESSION, UNSPECIFIED DEPRESSION TYPE: ICD-10-CM

## 2023-04-24 RX ORDER — CITALOPRAM 20 MG/1
TABLET ORAL
Qty: 30 TABLET | Refills: 0 | Status: SHIPPED | OUTPATIENT
Start: 2023-04-24

## 2023-04-25 ENCOUNTER — APPOINTMENT (EMERGENCY)
Dept: RADIOLOGY | Facility: HOSPITAL | Age: 38
End: 2023-04-25

## 2023-04-25 ENCOUNTER — HOSPITAL ENCOUNTER (INPATIENT)
Facility: HOSPITAL | Age: 38
LOS: 2 days | Discharge: HOME | End: 2023-04-27
Attending: EMERGENCY MEDICINE | Admitting: INTERNAL MEDICINE

## 2023-04-25 DIAGNOSIS — I21.3 STEMI (ST ELEVATION MYOCARDIAL INFARCTION) (HCC): Primary | ICD-10-CM

## 2023-04-25 DIAGNOSIS — I82.411 ACUTE DEEP VEIN THROMBOSIS (DVT) OF FEMORAL VEIN OF RIGHT LOWER EXTREMITY (HCC): ICD-10-CM

## 2023-04-25 DIAGNOSIS — E78.2 MODERATE MIXED HYPERLIPIDEMIA NOT REQUIRING STATIN THERAPY: Chronic | ICD-10-CM

## 2023-04-25 LAB
AMPHETAMINES SERPL QL SCN: NEGATIVE
ANION GAP SERPL CALCULATED.3IONS-SCNC: 11 MMOL/L (ref 4–13)
ANION GAP SERPL CALCULATED.3IONS-SCNC: 7 MMOL/L (ref 4–13)
APTT PPP: 26 SECONDS (ref 23–37)
BARBITURATES UR QL: NEGATIVE
BASOPHILS # BLD AUTO: 0.06 THOUSANDS/ΜL (ref 0–0.1)
BASOPHILS NFR BLD AUTO: 1 % (ref 0–1)
BENZODIAZ UR QL: POSITIVE
BUN SERPL-MCNC: 13 MG/DL (ref 5–25)
BUN SERPL-MCNC: 15 MG/DL (ref 5–25)
CALCIUM SERPL-MCNC: 8.4 MG/DL (ref 8.4–10.2)
CALCIUM SERPL-MCNC: 8.7 MG/DL (ref 8.4–10.2)
CARDIAC TROPONIN I PNL SERPL HS: 23 NG/L
CARDIAC TROPONIN I PNL SERPL HS: ABNORMAL NG/L (ref 8–18)
CHLORIDE SERPL-SCNC: 103 MMOL/L (ref 96–108)
CHLORIDE SERPL-SCNC: 106 MMOL/L (ref 96–108)
CHOLEST SERPL-MCNC: 272 MG/DL
CO2 SERPL-SCNC: 18 MMOL/L (ref 21–32)
CO2 SERPL-SCNC: 22 MMOL/L (ref 21–32)
COCAINE UR QL: NEGATIVE
CREAT SERPL-MCNC: 0.84 MG/DL (ref 0.6–1.3)
CREAT SERPL-MCNC: 0.97 MG/DL (ref 0.6–1.3)
EOSINOPHIL # BLD AUTO: 0.07 THOUSAND/ΜL (ref 0–0.61)
EOSINOPHIL NFR BLD AUTO: 1 % (ref 0–6)
ERYTHROCYTE [DISTWIDTH] IN BLOOD BY AUTOMATED COUNT: 12.8 % (ref 11.6–15.1)
GFR SERPL CREATININE-BSD FRML MDRD: 111 ML/MIN/1.73SQ M
GFR SERPL CREATININE-BSD FRML MDRD: 99 ML/MIN/1.73SQ M
GLUCOSE SERPL-MCNC: 100 MG/DL (ref 65–140)
GLUCOSE SERPL-MCNC: 130 MG/DL (ref 65–140)
HCT VFR BLD AUTO: 44.3 % (ref 36.5–49.3)
HDLC SERPL-MCNC: 31 MG/DL
HGB BLD-MCNC: 14.2 G/DL (ref 12–17)
IMM GRANULOCYTES # BLD AUTO: 0.04 THOUSAND/UL (ref 0–0.2)
IMM GRANULOCYTES NFR BLD AUTO: 0 % (ref 0–2)
INR PPP: 0.99 (ref 0.84–1.19)
KCT BLD-ACNC: 226 SEC (ref 89–137)
KCT BLD-ACNC: 252 SEC (ref 89–137)
KCT BLD-ACNC: 362 SEC (ref 89–137)
LDLC SERPL CALC-MCNC: 216 MG/DL (ref 0–100)
LYMPHOCYTES # BLD AUTO: 2.5 THOUSANDS/ΜL (ref 0.6–4.47)
LYMPHOCYTES NFR BLD AUTO: 24 % (ref 14–44)
MAGNESIUM SERPL-MCNC: 1.9 MG/DL (ref 1.9–2.7)
MCH RBC QN AUTO: 28 PG (ref 26.8–34.3)
MCHC RBC AUTO-ENTMCNC: 32.1 G/DL (ref 31.4–37.4)
MCV RBC AUTO: 87 FL (ref 82–98)
METHADONE UR QL: NEGATIVE
MONOCYTES # BLD AUTO: 0.73 THOUSAND/ΜL (ref 0.17–1.22)
MONOCYTES NFR BLD AUTO: 7 % (ref 4–12)
NEUTROPHILS # BLD AUTO: 7.17 THOUSANDS/ΜL (ref 1.85–7.62)
NEUTS SEG NFR BLD AUTO: 67 % (ref 43–75)
NONHDLC SERPL-MCNC: 241 MG/DL
NRBC BLD AUTO-RTO: 0 /100 WBCS
OPIATES UR QL SCN: NEGATIVE
OXYCODONE+OXYMORPHONE UR QL SCN: NEGATIVE
PCP UR QL: NEGATIVE
PLATELET # BLD AUTO: 348 THOUSANDS/UL (ref 149–390)
PMV BLD AUTO: 9.3 FL (ref 8.9–12.7)
POTASSIUM SERPL-SCNC: 2.9 MMOL/L (ref 3.5–5.3)
POTASSIUM SERPL-SCNC: 4.5 MMOL/L (ref 3.5–5.3)
PROTHROMBIN TIME: 13.3 SECONDS (ref 11.6–14.5)
RBC # BLD AUTO: 5.08 MILLION/UL (ref 3.88–5.62)
SODIUM SERPL-SCNC: 132 MMOL/L (ref 135–147)
SODIUM SERPL-SCNC: 135 MMOL/L (ref 135–147)
SPECIMEN SOURCE: ABNORMAL
THC UR QL: NEGATIVE
TRIGL SERPL-MCNC: 125 MG/DL
TSH SERPL DL<=0.05 MIU/L-ACNC: 1.4 UIU/ML (ref 0.45–4.5)
WBC # BLD AUTO: 10.57 THOUSAND/UL (ref 4.31–10.16)

## 2023-04-25 PROCEDURE — 027236Z DILATION OF CORONARY ARTERY, THREE ARTERIES WITH THREE DRUG-ELUTING INTRALUMINAL DEVICES, PERCUTANEOUS APPROACH: ICD-10-PCS | Performed by: INTERNAL MEDICINE

## 2023-04-25 PROCEDURE — B2111ZZ FLUOROSCOPY OF MULTIPLE CORONARY ARTERIES USING LOW OSMOLAR CONTRAST: ICD-10-PCS | Performed by: INTERNAL MEDICINE

## 2023-04-25 DEVICE — PERCLOSE™ PROSTYLE™ SUTURE-MEDIATED CLOSURE AND REPAIR SYSTEM
Type: IMPLANTABLE DEVICE | Site: GROIN | Status: FUNCTIONAL
Brand: PERCLOSE™ PROSTYLE™

## 2023-04-25 DEVICE — STENT ONYXNG22512UX ONYX 2.25X12RX
Type: IMPLANTABLE DEVICE | Site: CORONARY | Status: FUNCTIONAL
Brand: ONYX FRONTIER™

## 2023-04-25 DEVICE — STENT ONYXNG25015UX ONYX 2.50X15RX
Type: IMPLANTABLE DEVICE | Site: CORONARY | Status: FUNCTIONAL
Brand: ONYX FRONTIER™

## 2023-04-25 DEVICE — EVEROLIMUS-ELUTING PLATINUM CHROMIUM CORONARY STENT SYSTEM
Type: IMPLANTABLE DEVICE | Site: CORONARY | Status: FUNCTIONAL
Brand: SYNERGY™ XD

## 2023-04-25 RX ORDER — NITROGLYCERIN 20 MG/100ML
INJECTION INTRAVENOUS CODE/TRAUMA/SEDATION MEDICATION
Status: DISCONTINUED | OUTPATIENT
Start: 2023-04-25 | End: 2023-04-25 | Stop reason: HOSPADM

## 2023-04-25 RX ORDER — POTASSIUM CHLORIDE 20 MEQ/1
40 TABLET, EXTENDED RELEASE ORAL ONCE
Status: COMPLETED | OUTPATIENT
Start: 2023-04-25 | End: 2023-04-25

## 2023-04-25 RX ORDER — ATORVASTATIN CALCIUM 40 MG/1
80 TABLET, FILM COATED ORAL EVERY EVENING
Status: DISCONTINUED | OUTPATIENT
Start: 2023-04-25 | End: 2023-04-27 | Stop reason: HOSPADM

## 2023-04-25 RX ORDER — NITROGLYCERIN 0.4 MG/1
0.4 TABLET SUBLINGUAL
Status: DISCONTINUED | OUTPATIENT
Start: 2023-04-25 | End: 2023-04-27 | Stop reason: HOSPADM

## 2023-04-25 RX ORDER — SODIUM CHLORIDE 9 MG/ML
100 INJECTION, SOLUTION INTRAVENOUS CONTINUOUS
Status: DISPENSED | OUTPATIENT
Start: 2023-04-25 | End: 2023-04-26

## 2023-04-25 RX ORDER — ZOLPIDEM TARTRATE 5 MG/1
10 TABLET ORAL
Status: DISCONTINUED | OUTPATIENT
Start: 2023-04-25 | End: 2023-04-27 | Stop reason: HOSPADM

## 2023-04-25 RX ORDER — MIDAZOLAM HYDROCHLORIDE 2 MG/2ML
INJECTION, SOLUTION INTRAMUSCULAR; INTRAVENOUS CODE/TRAUMA/SEDATION MEDICATION
Status: DISCONTINUED | OUTPATIENT
Start: 2023-04-25 | End: 2023-04-25 | Stop reason: HOSPADM

## 2023-04-25 RX ORDER — LIDOCAINE HYDROCHLORIDE 10 MG/ML
INJECTION, SOLUTION EPIDURAL; INFILTRATION; INTRACAUDAL; PERINEURAL CODE/TRAUMA/SEDATION MEDICATION
Status: DISCONTINUED | OUTPATIENT
Start: 2023-04-25 | End: 2023-04-25 | Stop reason: HOSPADM

## 2023-04-25 RX ORDER — ONDANSETRON 2 MG/ML
4 INJECTION INTRAMUSCULAR; INTRAVENOUS ONCE
Status: COMPLETED | OUTPATIENT
Start: 2023-04-25 | End: 2023-04-25

## 2023-04-25 RX ORDER — ASPIRIN 81 MG/1
81 TABLET, CHEWABLE ORAL DAILY
Status: DISCONTINUED | OUTPATIENT
Start: 2023-04-26 | End: 2023-04-25 | Stop reason: SDUPTHER

## 2023-04-25 RX ORDER — VERAPAMIL HCL 2.5 MG/ML
AMPUL (ML) INTRAVENOUS CODE/TRAUMA/SEDATION MEDICATION
Status: DISCONTINUED | OUTPATIENT
Start: 2023-04-25 | End: 2023-04-25 | Stop reason: HOSPADM

## 2023-04-25 RX ORDER — HEPARIN SODIUM 1000 [USP'U]/ML
4000 INJECTION, SOLUTION INTRAVENOUS; SUBCUTANEOUS ONCE
Status: COMPLETED | OUTPATIENT
Start: 2023-04-25 | End: 2023-04-25

## 2023-04-25 RX ORDER — HEPARIN SODIUM 1000 [USP'U]/ML
INJECTION, SOLUTION INTRAVENOUS; SUBCUTANEOUS CODE/TRAUMA/SEDATION MEDICATION
Status: DISCONTINUED | OUTPATIENT
Start: 2023-04-25 | End: 2023-04-25 | Stop reason: HOSPADM

## 2023-04-25 RX ORDER — ATORVASTATIN CALCIUM 40 MG/1
80 TABLET, FILM COATED ORAL
Status: DISCONTINUED | OUTPATIENT
Start: 2023-04-25 | End: 2023-04-25 | Stop reason: SDUPTHER

## 2023-04-25 RX ORDER — POTASSIUM CHLORIDE 14.9 MG/ML
20 INJECTION INTRAVENOUS
Status: COMPLETED | OUTPATIENT
Start: 2023-04-25 | End: 2023-04-25

## 2023-04-25 RX ORDER — FENTANYL CITRATE 50 UG/ML
INJECTION, SOLUTION INTRAMUSCULAR; INTRAVENOUS CODE/TRAUMA/SEDATION MEDICATION
Status: DISCONTINUED | OUTPATIENT
Start: 2023-04-25 | End: 2023-04-25 | Stop reason: HOSPADM

## 2023-04-25 RX ORDER — CITALOPRAM 20 MG/1
20 TABLET ORAL DAILY
Status: DISCONTINUED | OUTPATIENT
Start: 2023-04-26 | End: 2023-04-27 | Stop reason: HOSPADM

## 2023-04-25 RX ORDER — PANTOPRAZOLE SODIUM 20 MG/1
20 TABLET, DELAYED RELEASE ORAL
Status: DISCONTINUED | OUTPATIENT
Start: 2023-04-26 | End: 2023-04-27 | Stop reason: HOSPADM

## 2023-04-25 RX ORDER — SODIUM CHLORIDE 9 MG/ML
INJECTION, SOLUTION INTRAVENOUS
Status: COMPLETED | OUTPATIENT
Start: 2023-04-25 | End: 2023-04-25

## 2023-04-25 RX ORDER — ASPIRIN 81 MG/1
81 TABLET, CHEWABLE ORAL DAILY
Status: DISCONTINUED | OUTPATIENT
Start: 2023-04-26 | End: 2023-04-27 | Stop reason: HOSPADM

## 2023-04-25 RX ADMIN — POTASSIUM CHLORIDE 40 MEQ: 1500 TABLET, EXTENDED RELEASE ORAL at 18:28

## 2023-04-25 RX ADMIN — ONDANSETRON 4 MG: 2 INJECTION INTRAMUSCULAR; INTRAVENOUS at 22:13

## 2023-04-25 RX ADMIN — POTASSIUM CHLORIDE 20 MEQ: 14.9 INJECTION, SOLUTION INTRAVENOUS at 18:29

## 2023-04-25 RX ADMIN — SODIUM CHLORIDE 100 ML/HR: 0.9 INJECTION, SOLUTION INTRAVENOUS at 22:12

## 2023-04-25 RX ADMIN — ATORVASTATIN CALCIUM 80 MG: 40 TABLET, FILM COATED ORAL at 18:29

## 2023-04-25 RX ADMIN — POTASSIUM CHLORIDE 40 MEQ: 1500 TABLET, EXTENDED RELEASE ORAL at 20:04

## 2023-04-25 RX ADMIN — ZOLPIDEM TARTRATE 10 MG: 5 TABLET ORAL at 22:13

## 2023-04-25 RX ADMIN — TICAGRELOR 180 MG: 90 TABLET ORAL at 15:34

## 2023-04-25 RX ADMIN — SODIUM CHLORIDE 100 ML/HR: 0.9 INJECTION, SOLUTION INTRAVENOUS at 18:28

## 2023-04-25 RX ADMIN — HEPARIN SODIUM 4000 UNITS: 1000 INJECTION INTRAVENOUS; SUBCUTANEOUS at 15:35

## 2023-04-25 NOTE — H&P
[facility]  H&P  Name: Nany Winn 40 y o  male I MRN: [id number]  Unit/Bed#: ICU 01 I Date of Admission: 4/25/2023   Date of Service: 4/25/2023 I Hospital Day: 0      Assessment/Plan   * STEMI (ST elevation myocardial infarction) [facility])  Assessment & Plan  -Pt experienced chest pain, vomiting, diaphoresis on 4/25  -EKG showed inferior STEMI, taken to cath lab w/ stent to RCA  -No prior cardiac history    Plan  -Started on atorvastatin 80, ASA, Brilinta  -Consider starting beta-blocker if hemodynamically stable  -f/u results of echo  -Cardiac monitoring  -O2 as needed    Hyperlipemia  Assessment & Plan  -Pt presented 4/25 w/ inferior MI, now s/p PCI  - on admission    Plan  -Begin high intensity statin therapy w/ atorvastatin 80  -Encourage healthy diet on discharge    Lab Results   Component Value Date    CHOLESTEROL 272 (H) 04/25/2023    CHOLESTEROL 250 (H) 12/07/2021     Lab Results   Component Value Date    HDL 31 (L) 04/25/2023    HDL 32 (L) 12/07/2021     Lab Results   Component Value Date    TRIG 125 04/25/2023    TRIG 221 (H) 12/07/2021     Lab Results   Component Value Date    [place] 241 04/25/2023    [place] 218 12/07/2021       Gastroesophageal reflux disease  Assessment & Plan  -Continue home Protonix     Sleep disturbance  Assessment & Plan  -Pt has long history of sleep disorder with unusual sleep patterns, takes Ambien at home long-term      Plan  -Continue home Ambien as long as pt is not hypotensive    Depression  Assessment & Plan  -Continue home celexa    Intellectual disability  Assessment & Plan  -Pt has history of unspecified mild intellectual disability, per chart review is reliant on loved ones for activities such as paying bills, but otherwise is largely independent    Plan  -Ensure pt understanding of care, re-orient if needed       History of Present Illness     HPI: Boltonsouleymane Martínez is a 40 y o  male with a past medical history of intellectual disability, depression, sleep disturbance, GERD who presents with an acute STEMI requiring cardiac catheterization  Today, the patient began with substernal chest pain with associated numbness of the bilateral upper extremities, nausea and 1 episode of vomiting, shortness of breath  In route with EMS, EKG showed a STEMI in the inferior leads  His symptoms resolved with a dose of aspirin and nitro  A STEMI alert was called and the patient was taken immediately to the cardiac catheter lab where he was found to have complete occlusion of the RCA, 80% occlusion proximal circumflex, and 70% occlusion OM1  PASTORA placed in each of these occluded vessels without any complication  Plan per cardiology is to start the patient on dual antiplatelet therapy, statin, beta-blockade, will have follow-up echocardiogram   Patient at this time denies any chest pain or shortness of breath, he states that he feels completely back to his normal self  He denies any other new symptoms as well  History obtained from mother and sibling, chart review and the patient  Review of Systems   Constitutional: Negative for chills and fever  HENT: Negative for ear pain and sore throat  Eyes: Negative for pain and visual disturbance  Respiratory: Negative for cough and shortness of breath  Cardiovascular: Negative for chest pain, palpitations and leg swelling  Gastrointestinal: Negative for abdominal pain, nausea and vomiting  Genitourinary: Negative for dysuria and hematuria  Musculoskeletal: Negative for arthralgias and back pain  Skin: Negative for color change and rash  Neurological: Negative for seizures and syncope  All other systems reviewed and are negative  Historical Information   Past Medical History:  No date: Allergic  No date: Anxiety  No date: Depression  No date: Diverticulitis  No date: Diverticulitis of colon  No date: GERD (gastroesophageal reflux disease)  No date:  Insomnia Past Surgical History:  No date: NO PAST SURGERIES   Current Outpatient Medications   Medication Instructions   • citalopram (CeleXA) 20 mg tablet take 1 tablet by mouth once daily   • omeprazole (PRILOSEC) 20 mg, Oral, Daily before breakfast   • zolpidem (AMBIEN) 10 mg, Oral, Daily at bedtime PRN    No Known Allergies   Social History     Tobacco Use   • Smoking status: Never   • Smokeless tobacco: Never   Vaping Use   • Vaping Use: Never used   Substance Use Topics   • Alcohol use: Yes     Comment: occasionally   • Drug use: Never    Family History   Problem Relation Age of Onset   • Depression Mother    • Anxiety disorder Mother    • Depression Father    • Anxiety disorder Father    • Prostate cancer Father    • Cancer Father    • Cancer Paternal Grandfather           Objective                            Vitals I/O      Most Recent Min/Max in 24hrs   Temp 97 6 °F (36 4 °C) Temp  Min: 97 6 °F (36 4 °C)  Max: 97 7 °F (36 5 °C)   Pulse 102 Pulse  Min: 100  Max: 103   Resp (!) 11 Resp  Min: 11  Max: 20   /81 BP  Min: 120/81  Max: 139/78   O2 Sat 99 % SpO2  Min: 95 %  Max: 100 %    No intake or output data in the 24 hours ending 04/25/23 1915      Diet Cardiac     Invasive Monitoring Physical exam   N/A Physical Exam  Vitals and nursing note reviewed  Constitutional:       General: He is not in acute distress  Appearance: Normal appearance  HENT:      Head: Normocephalic and atraumatic  Right Ear: External ear normal       Left Ear: External ear normal       Mouth/Throat:      Mouth: Mucous membranes are moist       Pharynx: Oropharynx is clear  Eyes:      General: No scleral icterus  Conjunctiva/sclera: Conjunctivae normal    Cardiovascular:      Rate and Rhythm: Regular rhythm  Tachycardia present  Pulses: Normal pulses  Heart sounds: Normal heart sounds  Pulmonary:      Effort: Pulmonary effort is normal  No respiratory distress  Breath sounds: Normal breath sounds   No wheezing, rhonchi or rales  Abdominal:      General: Abdomen is flat  There is no distension  Palpations: Abdomen is soft  Tenderness: There is no abdominal tenderness  Musculoskeletal:         General: No tenderness or signs of injury  Cervical back: Neck supple  No rigidity  Right lower leg: No edema  Left lower leg: No edema  Skin:     General: Skin is warm  Coloration: Skin is not jaundiced  Findings: No erythema or rash  Neurological:      General: No focal deficit present  Mental Status: He is alert and oriented to person, place, and time  Mental status is at baseline  Psychiatric:         Mood and Affect: Mood normal          Behavior: Behavior normal          Thought Content:  Thought content normal               Diagnostic Studies      EK/25 Normal sinus rhythm with rate of 98 BPM, ST elevations in leads II, III, aVF, ST depressions in V1-V3, concerning for inferior STEMI  Repat ECG post-cardiac cath showed normal sinus rhythm with resolution of ST changes  Imaging: N/A     Medications:  Scheduled PRN   [START ON 2023] aspirin, 81 mg, Daily  atorvastatin, 80 mg, QPM  [START ON 2023] citalopram, 20 mg, Daily  [START ON 2023] pantoprazole, 20 mg, Early Morning  potassium chloride, 40 mEq, Once  potassium chloride, 20 mEq, Q2H  [START ON 2023] ticagrelor, 90 mg, Q12H [address]      nitroglycerin, 0 4 mg, Q5 Min PRN  zolpidem, 10 mg, HS PRN       Continuous    sodium chloride, 100 mL/hr, Last Rate: 100 mL/hr (23 1828)         Labs:    CBC    Recent Labs     23  1535   WBC 10 57*   HGB 14 2   HCT 44 3        BMP    Recent Labs     23  1535   SODIUM 135   K 2 9*      CO2 18*   AGAP 11   BUN 15   CREATININE 0 97   CALCIUM 8 4       Coags    Recent Labs     23  1535   INR 0 99   PTT 26        Additional Electrolytes  Recent Labs     23  1535   MG 1 9          Blood Gas    No recent results  No recent results LFTs  No recent results    Infectious  No recent results  Glucose  Recent Labs     04/25/23  1535   GLUC 130             Critical Care Time Delivered: Upon my evaluation, this patient had a high probability of imminent or life-threatening deterioration due to STEMI, which required my direct attention, intervention, and personal management  I have personally provided 35 minutes of critical care time, exclusive of procedures, teaching, family meetings, and any prior time recorded by providers other than myself     Anticipated Length of Stay is > 2 midnights  Dean Rosa, DO

## 2023-04-25 NOTE — ED ATTENDING ATTESTATION
4/25/2023  IKiera DO, saw and evaluated the patient  I have discussed the patient with the resident/non-physician practitioner and agree with the resident's/non-physician practitioner's findings, Plan of Care, and MDM as documented in the resident's/non-physician practitioner's note, except where noted  All available labs and Radiology studies were reviewed  I was present for key portions of any procedure(s) performed by the resident/non-physician practitioner and I was immediately available to provide assistance  At this point I agree with the current assessment done in the Emergency Department  I have conducted an independent evaluation of this patient a history and physical is as follows:    41 yo male coming into the ED for evaluation of severe chest pain with associated diaphoresis, vomiting, shortness of breath  EMS called ahead with an EKG that showed a STEMI in the inferior leads  Symptoms resolved with aspirin and nitro  No medical history, denies drug use  PE:  The patient is well appearing, non-toxic, in NAD  Head: normocephalic, atraumatic  HEENT: mucous membranes moist   Lungs: CTA b/l, no resp distress  Heart: RRR  No M/R/G  Abdomen: NT, ND, no R/R/G  Neuro: CN2-12 intact, GCS 15  Normal strength and sensation, normal speech and gait  Cap refill < 2 sec, skin warm and dry  No rashes or lesions  Discussed case w/ Dr Loren Bowman, STEMI called, will take to Cath Lab  Heparin and brilinta given in the ED  Aspirin taken PTA      ED Course         Critical Care Time  Procedures

## 2023-04-25 NOTE — ASSESSMENT & PLAN NOTE
-Pt presented 4/25 w/ inferior MI, now s/p PCI  - on admission    Plan  -Begin high intensity statin therapy w/ atorvastatin 80  -Encourage healthy diet on discharge    Lab Results   Component Value Date    CHOLESTEROL 272 (H) 04/25/2023    CHOLESTEROL 250 (H) 12/07/2021     Lab Results   Component Value Date    HDL 31 (L) 04/25/2023    HDL 32 (L) 12/07/2021     Lab Results   Component Value Date    TRIG 125 04/25/2023    TRIG 221 (H) 12/07/2021     Lab Results   Component Value Date    [facility] 241 04/25/2023    [facility] 218 12/07/2021

## 2023-04-25 NOTE — ED PROVIDER NOTES
History  Chief Complaint   Patient presents with   • Chest Pain     Pt reports sudden chest pain, pre hospital MI alert  No cardiac hx      80-year-old male no significant past medical history presented by EMS with a complaint of sudden onset of chest pain at rest about an hour ago  Patient reported  he had just finished cooking dinner, took a shower and sat in his chair when he suddenly experienced a sharp sensation of substernal chest pain with bilateral arm numbness  Patient reported having nausea and vomited once during the episode as well as palpitations and shortness of breath that resolved few minutes later  Patient's mother immediately called 46  On EMS arrival, subsequent EKG showed ST segment elevation in leads 2, 3, aVF as well as reciprocal changes in V1 V2 V3 suggestive of inferior wall MI  Patient denies any cocaine or drug use  He also denies any history of hypertension, diabetes or family history of sudden cardiac death  Patient was given aspirin by paramedics on arrival to patient's house  Patient is seen at bedside, currently reports no chest pain  MI alert was called and patient was transferred straight to the Cath Lab  Prior to Admission Medications   Prescriptions Last Dose Informant Patient Reported?  Taking?   citalopram (CeleXA) 20 mg tablet   No No   Sig: take 1 tablet by mouth once daily   omeprazole (PriLOSEC) 20 mg delayed release capsule   No No   Sig: Take 1 capsule (20 mg total) by mouth daily before breakfast   zolpidem (AMBIEN) 10 mg tablet   No No   Sig: Take 1 tablet (10 mg total) by mouth daily at bedtime as needed for sleep      Facility-Administered Medications: None       Past Medical History:   Diagnosis Date   • Allergic    • Anxiety    • Depression    • Diverticulitis    • Diverticulitis of colon    • GERD (gastroesophageal reflux disease)    • Insomnia        Past Surgical History:   Procedure Laterality Date   • NO PAST SURGERIES         Family History Problem Relation Age of Onset   • Depression Mother    • Anxiety disorder Mother    • Depression Father    • Anxiety disorder Father    • Prostate cancer Father    • Cancer Father    • Cancer Paternal Grandfather      I have reviewed and agree with the history as documented  E-Cigarette/Vaping   • E-Cigarette Use Never User      E-Cigarette/Vaping Substances     Social History     Tobacco Use   • Smoking status: Never   • Smokeless tobacco: Never   Vaping Use   • Vaping Use: Never used   Substance Use Topics   • Alcohol use: Yes     Comment: occasionally   • Drug use: Never        Review of Systems   Constitutional: Negative for chills and fever  HENT: Negative for ear pain and sore throat  Eyes: Negative for pain and visual disturbance  Respiratory: Negative for cough and shortness of breath  Cardiovascular: Positive for chest pain (resolved) and palpitations (resolved)  Gastrointestinal: Positive for nausea (resolved) and vomiting (resolved)  Negative for abdominal pain  Genitourinary: Negative for dysuria and hematuria  Musculoskeletal: Negative for arthralgias and back pain  Skin: Negative for color change and rash  Neurological: Negative for seizures and syncope  All other systems reviewed and are negative  Physical Exam  ED Triage Vitals   Temperature Pulse Respirations Blood Pressure SpO2   04/25/23 1538 04/25/23 1530 04/25/23 1530 04/25/23 1530 04/25/23 1530   97 7 °F (36 5 °C) 103 20 128/85 99 %      Temp Source Heart Rate Source Patient Position - Orthostatic VS BP Location FiO2 (%)   04/25/23 1538 04/25/23 1530 04/25/23 1530 04/25/23 1530 --   Oral Monitor Lying Right arm       Pain Score       04/25/23 1532       No Pain             Orthostatic Vital Signs  Vitals:    04/25/23 1530 04/25/23 1540   BP: 128/85 132/83   Pulse: 103 100   Patient Position - Orthostatic VS: Lying        Physical Exam  Vitals and nursing note reviewed     Constitutional:       General: He is not in acute distress  Appearance: He is well-developed  HENT:      Head: Normocephalic and atraumatic  Eyes:      Conjunctiva/sclera: Conjunctivae normal    Cardiovascular:      Rate and Rhythm: Normal rate and regular rhythm  Heart sounds: No murmur heard  Pulmonary:      Effort: Pulmonary effort is normal  No respiratory distress  Breath sounds: Normal breath sounds  Abdominal:      Palpations: Abdomen is soft  Tenderness: There is no abdominal tenderness  Musculoskeletal:         General: No swelling  Cervical back: Neck supple  Skin:     General: Skin is warm and dry  Capillary Refill: Capillary refill takes less than 2 seconds  Neurological:      General: No focal deficit present  Mental Status: He is alert and oriented to person, place, and time     Psychiatric:         Mood and Affect: Mood normal          ED Medications  Medications   ticagrelor (BRILINTA) tablet 180 mg (180 mg Oral Given 4/25/23 1534)     And   ticagrelor (BRILINTA) tablet 90 mg (has no administration in time range)   sodium chloride 0 9 % infusion (100 mL/hr Intravenous New Bag 4/25/23 1557)   fentanyl citrate (PF) 100 MCG/2ML (25 mcg Intravenous Given 4/25/23 1701)   midazolam (VERSED) injection (1 mg Intravenous Given 4/25/23 1701)   lidocaine (PF) (XYLOCAINE-MPF) 1 % injection (10 mL Infiltration Given 4/25/23 1702)   atorvastatin (LIPITOR) tablet 80 mg (has no administration in time range)   aspirin chewable tablet 81 mg (has no administration in time range)   nitroGLYcerin 200 mcg/mL IA bolus (200 mcg Intra-arterial Given 4/25/23 1608)   verapamil (ISOPTIN) injection (1 25 mg Intra-arterial Given 4/25/23 1608)   heparin (porcine) injection (5,000 Units Intravenous Given 4/25/23 1619)   potassium chloride (K-DUR,KLOR-CON) CR tablet 40 mEq (has no administration in time range)   potassium chloride (K-DUR,KLOR-CON) CR tablet 40 mEq (has no administration in time range)   nitroGLYcerin 200 mcg/mL IC bolus (400 mcg Intracoronary Given 4/25/23 1624)   heparin (porcine) injection 4,000 Units (4,000 Units Intravenous Given 4/25/23 1535)       Diagnostic Studies  Results Reviewed     Procedure Component Value Units Date/Time    HS Troponin 0hr (reflex protocol) [[id number]]  (Normal) Collected: 04/25/23 1535    Lab Status: Final result Specimen: Blood from Arm, Left Updated: 04/25/23 1604     hs TnI 0hr 23 ng/L     Basic metabolic panel [[id number]]  (Abnormal) Collected: 04/25/23 1535    Lab Status: Final result Specimen: Blood from Arm, Left Updated: 04/25/23 1601     Sodium 135 mmol/L      Potassium 2 9 mmol/L      Chloride 106 mmol/L      CO2 18 mmol/L      ANION GAP 11 mmol/L      BUN 15 mg/dL      Creatinine 0 97 mg/dL      Glucose 130 mg/dL      Calcium 8 4 mg/dL      eGFR 99 ml/min/1 73sq m     Narrative:      [place] guidelines for Chronic Kidney Disease (CKD):   •  Stage 1 with normal or high GFR (GFR > 90 mL/min/1 73 square meters)  •  Stage 2 Mild CKD (GFR = 60-89 mL/min/1 73 square meters)  •  Stage 3A Moderate CKD (GFR = 45-59 mL/min/1 73 square meters)  •  Stage 3B Moderate CKD (GFR = 30-44 mL/min/1 73 square meters)  •  Stage 4 Severe CKD (GFR = 15-29 mL/min/1 73 square meters)  •  Stage 5 End Stage CKD (GFR <15 mL/min/1 73 square meters)  Note: GFR calculation is accurate only with a steady state creatinine    Lipid panel [[id number]]  (Abnormal) Collected: 04/25/23 1535    Lab Status: Final result Specimen: Blood from Arm, Left Updated: 04/25/23 1601     Cholesterol 272 mg/dL      Triglycerides 125 mg/dL      HDL, Direct 31 mg/dL      LDL Calculated 216 mg/dL      Non-HDL-Chol (CHOL-HDL) 241 mg/dl     Magnesium [[id number]]  (Normal) Collected: 04/25/23 1535    Lab Status: Final result Specimen: Blood from Arm, Left Updated: 04/25/23 1601     Magnesium 1 9 mg/dL     Protime-INR [[id number]]  (Normal) Collected: 04/25/23 1535    Lab Status: Final result Specimen: Blood from Arm, Left Updated: 04/25/23 1556     Protime 13 3 seconds      INR 0 99    APTT [[id number]]  (Normal) Collected: 04/25/23 1535    Lab Status: Final result Specimen: Blood from Arm, Left Updated: 04/25/23 1556     PTT 26 seconds     CBC and differential [[id number]]  (Abnormal) Collected: 04/25/23 1535    Lab Status: Final result Specimen: Blood from Arm, Left Updated: 04/25/23 1541     WBC 10 57 Thousand/uL      RBC 5 08 Million/uL      Hemoglobin 14 2 g/dL      Hematocrit 44 3 %      MCV 87 fL      MCH 28 0 pg      MCHC 32 1 g/dL      RDW 12 8 %      MPV 9 3 fL      Platelets 786 Thousands/uL      nRBC 0 /100 WBCs      Neutrophils Relative 67 %      Immat GRANS % 0 %      Lymphocytes Relative 24 %      Monocytes Relative 7 %      Eosinophils Relative 1 %      Basophils Relative 1 %      Neutrophils Absolute 7 17 Thousands/µL      Immature Grans Absolute 0 04 Thousand/uL      Lymphocytes Absolute 2 50 Thousands/µL      Monocytes Absolute 0 73 Thousand/µL      Eosinophils Absolute 0 07 Thousand/µL      Basophils Absolute 0 06 Thousands/µL                  XR chest 1 view portable    (Results Pending)         Procedures  ECG 12 Lead Documentation Only    Date/Time: 4/25/2023 4:53 PM  Performed by: Alli Orellana MD  Authorized by: Alli Orellana MD     Indications / Diagnosis:  STEMI  ECG reviewed by me, the ED Provider: yes    Patient location:  ED  Previous ECG:     Previous ECG:  Unavailable    Comparison to cardiac monitor: Yes    Interpretation:     Interpretation: abnormal    Rate:     ECG rate:  98 bpm    ECG rate assessment: normal    Rhythm:     Rhythm: sinus rhythm    Ectopy:     Ectopy: none    QRS:     QRS axis:  Normal  Conduction:     Conduction: abnormal    ST segments:     ST segments:  Abnormal    Elevation:  II, III and aVF    Depression:  V1, V2 and V3  T waves:     T waves: peaked      Peaked:  II, III and aVF  Comments:      Ventricular rate 98 bpm, CO interval 162 ms , QRS duration 92 ms,  ms, QTc 444 ms  There is acute ST segment elevation in leads 2, 3, aVF and reciprocal depression in V1 V2 V3 suggestive of inferior wall MI  Normal sinus rhythm  Conscious Sedation Assessment    Flowsheet Row Classification Score   ASA Scale Assessment 3-Severe systemic disease that results in functional limitation filed at 04/25/2023 1555   Mallampati Classification Class I: soft palate, uvula, fauces, pillars visible - No Difficulty filed at 04/25/2023 1555          ED Course  ED Course as of 04/25/23 1704   Tue Apr 25, 2023   124 63-year-old male no significant past medical history presented by EMS as an MI alert  1605 Patient was given aspirin by paramedic before arrival   Patient was asymptomatic on arrival to the ED  He was given heparin, Brilinta and was sent straight to Cath Lab    2173 Basic metabolic panel(!)  Mildly decreased potassium of 2 9, no anion gap no PASQUALE   1608 Lipid panel(!)  Evaded cholesterol of 272, low HDL of 31 and elevated LDL of 216   1609 Protime-INR  Within normal limit   1609 CBC and differential(!)  Mildly elevated WBC of 10 57   1640 Patient taken straight to Cath Lab                                       Medical Decision Making  63-year-old male no significant past medical history presented by EMS as an MI alert  Patient was given aspirin by paramedic before arrival   Patient was asymptomatic on arrival to the ED  He was given heparin, Brilinta and was sent straight to Cath Lab    Basic metabolic panel(!) Mildly decreased potassium of 2 9, no anion gap no PASQUALE  Lipid panel(!) Evaded cholesterol of 272, low HDL of 31 and elevated LDL of 216  Protime-INR Within normal limit  CBC and differential(!) Mildly elevated WBC of 10 57  Differential diagnoses includes ST segment elevation myocardial infarction, cocaine abuse , ACS among others    Patient taken straight to Cath Lab      STEMI (ST elevation myocardial infarction) [facility]): acute illness or injury  Amount and/or Complexity of Data Reviewed  Independent Historian: EMS  Labs: ordered  Decision-making details documented in ED Course  Radiology: ordered  Risk  Prescription drug management  Decision regarding hospitalization  Disposition  Final diagnoses:   [facility]     Time reflects when diagnosis was documented in both MDM as applicable and the Disposition within this note     Time User Action Codes Description Comment    4/25/2023  3:34 PM Inell Majestic, [address] [I21 3] STEMI (ST elevation myocardial infarction) ([facility] [address] )     4/25/2023  3:39 PM Gela Ian VALENTIN Modify [I21 3] STEMI (ST elevation myocardial infarction) [facility])       ED Disposition     ED Disposition   Send to Cath Lab    Condition   --    Date/Time   Tue Apr 25, 2023  3:53 PM    Comment   --         Follow-up Information    None         Current Discharge Medication List      CONTINUE these medications which have NOT CHANGED    Details   citalopram (CeleXA) 20 mg tablet take 1 tablet by mouth once daily  Qty: 30 tablet, Refills: 0    Associated Diagnoses: Depression, unspecified depression type      omeprazole (PriLOSEC) 20 mg delayed release capsule Take 1 capsule (20 mg total) by mouth daily before breakfast  Qty: 90 capsule, Refills: 3    Associated Diagnoses: Gastroesophageal reflux disease, unspecified whether esophagitis present      zolpidem (AMBIEN) 10 mg tablet Take 1 tablet (10 mg total) by mouth daily at bedtime as needed for sleep  Qty: 30 tablet, Refills: 0    Comments: Please disregard prior 3/22/2023 fill date as the one he got 2/22/2023 was only 14 tabs  Associated Diagnoses: Sleep disturbance           No discharge procedures on file  PDMP Review       Value Time User    PDMP Reviewed  Yes 4/16/2023 11:11 AM Jamari Gonzales MD           ED Provider  Attending physically available and evaluated Anita Edmondjessicajose de jesus   I managed the patient along with the ED Attending      Electronically Signed by         Briana Aviles MD  04/25/23 2386

## 2023-04-25 NOTE — ASSESSMENT & PLAN NOTE
-Pt experienced chest pain, vomiting, diaphoresis on 4/25  -EKG showed inferior STEMI, taken to cath lab w/ stent to RCA  -No prior cardiac history    Plan  -Started on atorvastatin 80, ASA, Brilinta  -Consider starting beta-blocker if hemodynamically stable  -f/u results of echo  -Cardiac monitoring  -O2 as needed

## 2023-04-25 NOTE — ASSESSMENT & PLAN NOTE
-Pt has history of unspecified mild intellectual disability, per chart review is reliant on loved ones for activities such as paying bills, but otherwise is largely independent    Plan  -Ensure pt understanding of care, re-orient if needed

## 2023-04-25 NOTE — ASSESSMENT & PLAN NOTE
-Pt has long history of sleep disorder with unusual sleep patterns, takes Ambien at home long-term      Plan  -Continue home Ambien as long as pt is not hypotensive

## 2023-04-25 NOTE — CONSULTS
Consultation - Cardiology Team One  Cruz Leo [address] [facility] 40 y o  male MRN: [id number]  Unit/Bed#: AN CATH LAB ROOM Encounter: [id number]    Inpatient consult to Cardiology  Consult performed by: LUX Gonzalez  Consult ordered by: Gagan Hanley DO      Physician Requesting Consult: No att  providers found  Reason for Consult / Principal Problem: STEMI    Assessment    1  Inferior STEMI  Presented with sudden onset chest pain, diaphoresis, and vomiting  ECG showed inferior ST elevations  Symptoms resolved with aspirin and nitroglycerin in the ambulance  Loaded with ASA, ticagrelor, and IV heparin  Taken urgently to cath lab    2  HLD- , , HDL 31,   LDL elevated back in 2021 as well  Not on statin therapy PTA  3  Depression    Plan  1  Cardiac catheterization +/- PCI  2  Start atorvastatin 80 mg daily  3  Check echocardiogram  4  Start beta blocker after LHC  5  Monitor on telemetry    History of Present Illness   HPI: Casandra Enrique is a 40y o  year old male with intellectual disability, chronic sleep disturbance, and depression who presented to the ED as a pre hospital MI alert  He had laid down in bed after taking a shower and developed sudden onset chest pain with numbness in both arms  This was associated with diaphoresis and vomiting  EMS was summoned and ECG showed inferior ST elevations with anterior reciprocal changes  He was given 324 mg ASA and SL nitroglycerin with resolution of his symptoms  In the ED, ECG again demonstrated inferior ST elevations however he was feeling much better  He was given 4,000 units IV heparin and 180 mg ticagrelor before being taken urgently to the cath lab  He lives at home with his parents  He is on disability but independent with all ADLs and verbalizes understanding of the situation he is currently in  He mowed the grass last week without any exertional symptoms  He denies any tobacco or drug use  He drinks beer once per month   There isn't any family history of CAD that he is aware of  EKG reviewed personally: sinus tachycardia with inferior ST elevations    Telemetry reviewed personally: sinus tachycardia    Review of Systems   Constitutional: Positive for diaphoresis  Negative for chills, malaise/fatigue and weight gain  Cardiovascular: Positive for chest pain  Negative for dyspnea on exertion, leg swelling, orthopnea, palpitations and syncope  Respiratory: Positive for shortness of breath  Negative for cough, sleep disturbances due to breathing and sputum production  Endocrine: Negative  Gastrointestinal: Positive for nausea and vomiting  Negative for bloating  Neurological: Negative for dizziness, light-headedness and weakness  Psychiatric/Behavioral: Negative for altered mental status  All other systems reviewed and are negative      Historical Information   Past Medical History:   Diagnosis Date   • Allergic    • Anxiety    • Depression    • Diverticulitis    • Diverticulitis of colon    • GERD (gastroesophageal reflux disease)    • Insomnia      Past Surgical History:   Procedure Laterality Date   • NO PAST SURGERIES       Social History     Substance and Sexual Activity   Alcohol Use Yes    Comment: occasionally     Social History     Substance and Sexual Activity   Drug Use Never     Social History     Tobacco Use   Smoking Status Never   Smokeless Tobacco Never     Family History:   Family History   Problem Relation Age of Onset   • Depression Mother    • Anxiety disorder Mother    • Depression Father    • Anxiety disorder Father    • Prostate cancer Father    • Cancer Father    • Cancer Paternal Grandfather        Meds/Allergies   all current active meds have been reviewed and current meds:   Current Facility-Administered Medications   Medication Dose Route Frequency   • [START ON 4/26/2023] ticagrelor (BRILINTA) tablet 90 mg  90 mg Oral Q12H [address]        No Known Allergies    Objective   Vitals: Blood pressure 132/83, pulse 100, temperature 97 7 °F (36 5 °C), temperature source Oral, resp  rate 20, weight 85 4 kg (188 lb 4 4 oz), SpO2 95 %  , Body mass index is 27 8 kg/m²  ,     Systolic (02LAM), KATHLEEN:995 , Min:128 , VOC:775     Diastolic (39ZPT), NBF:46, Min:83, Max:85      No intake or output data in the 24 hours ending 04/25/23 1552  Wt Readings from Last 3 Encounters:   04/25/23 85 4 kg (188 lb 4 4 oz)   03/28/23 82 4 kg (181 lb 9 6 oz)   02/28/23 81 7 kg (180 lb 3 2 oz)     Invasive Devices     Peripheral Intravenous Line  Duration           Peripheral IV 04/25/23 Left Antecubital <1 day    Peripheral IV 04/25/23 Left;Ventral (anterior) Forearm <1 day              Physical Exam  Vitals reviewed  Constitutional:       Appearance: He is ill-appearing  Neck:      Vascular: No hepatojugular reflux or JVD  Cardiovascular:      Rate and Rhythm: Regular rhythm  Tachycardia present  Pulses: Normal pulses  Heart sounds: Normal heart sounds  No murmur heard  No friction rub  No gallop  Pulmonary:      Effort: Pulmonary effort is normal  No respiratory distress  Breath sounds: No rales  Comments: Room air  Abdominal:      General: Bowel sounds are normal  There is no distension  Palpations: Abdomen is soft  Tenderness: There is no abdominal tenderness  Musculoskeletal:         General: No tenderness  Normal range of motion  Cervical back: Neck supple  Right lower leg: No edema  Left lower leg: No edema  Skin:     General: Skin is warm  Capillary Refill: Capillary refill takes less than 2 seconds  Findings: No erythema  Comments: clammy   Neurological:      Mental Status: He is alert and oriented to person, place, and time  Psychiatric:         Attention and Perception: Attention normal          Mood and Affect: Mood is anxious       LABORATORY RESULTS:      CBC with diff:   Results from last 7 days   Lab Units 04/25/23  1535   WBC Thousand/uL 10 57*   HEMOGLOBIN g/dL 14 2   HEMATOCRIT % 44 3   MCV fL 87   PLATELETS Thousands/uL 348   MCH pg 28 0   MCHC g/dL 32 1   RDW % 12 8   MPV fL 9 3   NRBC AUTO /100 WBCs 0       CMP:      Invalid input(s): ALBUMIN    BMP:      Invalid input(s): LABGLOM    Lab Results   Component Value Date    CREATININE 1 14 12/07/2021       Lipid Profile:   No results found for: CHOL  Lab Results   Component Value Date    HDL 32 (L) 12/07/2021     Lab Results   Component Value Date    LDLCALC 174 (H) 12/07/2021     Lab Results   Component Value Date    TRIG 221 (H) 12/07/2021     Imaging: I have personally reviewed pertinent reports  and I have personally reviewed pertinent films in PACS    Counseling / Coordination of Care  Total floor / unit time spent today 45 minutes  Greater than 50% of total time was spent with the patient and / or family counseling and / or coordination of care  A description of the counseling / coordination of care: Review of history, current assessment, development of a plan  Code Status: No Order    ** Please Note: Dragon 360 Dictation voice to text software may have been used in the creation of this document   **

## 2023-04-26 ENCOUNTER — APPOINTMENT (INPATIENT)
Dept: NON INVASIVE DIAGNOSTICS | Facility: HOSPITAL | Age: 38
End: 2023-04-26

## 2023-04-26 PROBLEM — Z13.9 SCREENING DUE: Status: RESOLVED | Noted: 2023-03-31 | Resolved: 2023-04-26

## 2023-04-26 PROBLEM — F79 INTELLECTUAL DISABILITY: Chronic | Status: ACTIVE | Noted: 2021-12-03

## 2023-04-26 PROBLEM — G47.9 SLEEP DISTURBANCE: Chronic | Status: ACTIVE | Noted: 2021-12-03

## 2023-04-26 PROBLEM — E87.1 HYPONATREMIA: Status: ACTIVE | Noted: 2023-04-26

## 2023-04-26 PROBLEM — K21.9 GASTROESOPHAGEAL REFLUX DISEASE: Chronic | Status: ACTIVE | Noted: 2022-01-03

## 2023-04-26 PROBLEM — Z23 ENCOUNTER FOR IMMUNIZATION: Status: RESOLVED | Noted: 2021-12-03 | Resolved: 2023-04-26

## 2023-04-26 PROBLEM — E78.5 HYPERLIPEMIA: Chronic | Status: ACTIVE | Noted: 2022-01-03

## 2023-04-26 PROBLEM — F32.A DEPRESSION: Chronic | Status: ACTIVE | Noted: 2021-12-03

## 2023-04-26 LAB
ANION GAP SERPL CALCULATED.3IONS-SCNC: 5 MMOL/L (ref 4–13)
AORTIC ROOT: 3.3 CM
APICAL FOUR CHAMBER EJECTION FRACTION: 53 %
ASCENDING AORTA: 2.9 CM
BASOPHILS # BLD AUTO: 0.04 THOUSANDS/ΜL (ref 0–0.1)
BASOPHILS NFR BLD AUTO: 0 % (ref 0–1)
BUN SERPL-MCNC: 10 MG/DL (ref 5–25)
CALCIUM SERPL-MCNC: 8.3 MG/DL (ref 8.4–10.2)
CHLORIDE SERPL-SCNC: 105 MMOL/L (ref 96–108)
CO2 SERPL-SCNC: 26 MMOL/L (ref 21–32)
CREAT SERPL-MCNC: 0.81 MG/DL (ref 0.6–1.3)
E WAVE DECELERATION TIME: 179 MS
EOSINOPHIL # BLD AUTO: 0.01 THOUSAND/ΜL (ref 0–0.61)
EOSINOPHIL NFR BLD AUTO: 0 % (ref 0–6)
ERYTHROCYTE [DISTWIDTH] IN BLOOD BY AUTOMATED COUNT: 13 % (ref 11.6–15.1)
FRACTIONAL SHORTENING: 31 % (ref 28–44)
GFR SERPL CREATININE-BSD FRML MDRD: 113 ML/MIN/1.73SQ M
GLUCOSE SERPL-MCNC: 103 MG/DL (ref 65–140)
HCT VFR BLD AUTO: 36.3 % (ref 36.5–49.3)
HGB BLD-MCNC: 11.7 G/DL (ref 12–17)
IMM GRANULOCYTES # BLD AUTO: 0.06 THOUSAND/UL (ref 0–0.2)
IMM GRANULOCYTES NFR BLD AUTO: 1 % (ref 0–2)
INTERVENTRICULAR SEPTUM IN DIASTOLE (PARASTERNAL SHORT AXIS VIEW): 1 CM
INTERVENTRICULAR SEPTUM: 1 CM (ref 0.6–1.1)
LAAS-AP2: 14.9 CM2
LAAS-AP4: 15.1 CM2
LEFT ATRIUM SIZE: 3.3 CM
LEFT INTERNAL DIMENSION IN SYSTOLE: 3.1 CM (ref 2.1–4)
LEFT VENTRICLE DIASTOLIC VOLUME (MOD BIPLANE): 101 ML
LEFT VENTRICLE SYSTOLIC VOLUME (MOD BIPLANE): 55 ML
LEFT VENTRICULAR INTERNAL DIMENSION IN DIASTOLE: 4.5 CM (ref 3.5–6)
LEFT VENTRICULAR POSTERIOR WALL IN END DIASTOLE: 1 CM
LEFT VENTRICULAR STROKE VOLUME: 57 ML
LV EF: 45 %
LVSV (TEICH): 57 ML
LYMPHOCYTES # BLD AUTO: 1.22 THOUSANDS/ΜL (ref 0.6–4.47)
LYMPHOCYTES NFR BLD AUTO: 12 % (ref 14–44)
MCH RBC QN AUTO: 28.3 PG (ref 26.8–34.3)
MCHC RBC AUTO-ENTMCNC: 32.2 G/DL (ref 31.4–37.4)
MCV RBC AUTO: 88 FL (ref 82–98)
MONOCYTES # BLD AUTO: 0.92 THOUSAND/ΜL (ref 0.17–1.22)
MONOCYTES NFR BLD AUTO: 9 % (ref 4–12)
MV E'TISSUE VEL-SEP: 11 CM/S
MV PEAK A VEL: 0.5 M/S
MV PEAK E VEL: 69 CM/S
MV STENOSIS PRESSURE HALF TIME: 52 MS
MV VALVE AREA P 1/2 METHOD: 4.23 CM2
NEUTROPHILS # BLD AUTO: 7.68 THOUSANDS/ΜL (ref 1.85–7.62)
NEUTS SEG NFR BLD AUTO: 78 % (ref 43–75)
NRBC BLD AUTO-RTO: 0 /100 WBCS
PLATELET # BLD AUTO: 247 THOUSANDS/UL (ref 149–390)
PMV BLD AUTO: 9.1 FL (ref 8.9–12.7)
POTASSIUM SERPL-SCNC: 4.2 MMOL/L (ref 3.5–5.3)
RA PRESSURE ESTIMATED: 5 MMHG
RBC # BLD AUTO: 4.14 MILLION/UL (ref 3.88–5.62)
RIGHT ATRIUM AREA SYSTOLE A4C: 10.4 CM2
RIGHT VENTRICLE ID DIMENSION: 3.3 CM
RV PSP: 20 MMHG
SL CV LEFT ATRIUM LENGTH A2C: 4.7 CM
SL CV PED ECHO LEFT VENTRICLE DIASTOLIC VOLUME (MOD BIPLANE) 2D: 95 ML
SL CV PED ECHO LEFT VENTRICLE SYSTOLIC VOLUME (MOD BIPLANE) 2D: 38 ML
SODIUM SERPL-SCNC: 136 MMOL/L (ref 135–147)
TR MAX PG: 15 MMHG
TR PEAK VELOCITY: 2 M/S
TRICUSPID ANNULAR PLANE SYSTOLIC EXCURSION: 2.2 CM
TRICUSPID VALVE PEAK REGURGITATION VELOCITY: 1.96 M/S
TRIGL SERPL-MCNC: 79 MG/DL
WBC # BLD AUTO: 9.93 THOUSAND/UL (ref 4.31–10.16)

## 2023-04-26 RX ORDER — ENOXAPARIN SODIUM 100 MG/ML
40 INJECTION SUBCUTANEOUS
Status: DISCONTINUED | OUTPATIENT
Start: 2023-04-26 | End: 2023-04-27 | Stop reason: HOSPADM

## 2023-04-26 RX ORDER — LANOLIN ALCOHOL/MO/W.PET/CERES
3 CREAM (GRAM) TOPICAL
Status: DISCONTINUED | OUTPATIENT
Start: 2023-04-26 | End: 2023-04-27 | Stop reason: HOSPADM

## 2023-04-26 RX ADMIN — ZOLPIDEM TARTRATE 10 MG: 5 TABLET ORAL at 21:09

## 2023-04-26 RX ADMIN — CITALOPRAM HYDROBROMIDE 20 MG: 20 TABLET ORAL at 08:02

## 2023-04-26 RX ADMIN — ASPIRIN 81 MG: 81 TABLET, CHEWABLE ORAL at 08:02

## 2023-04-26 RX ADMIN — ATORVASTATIN CALCIUM 80 MG: 40 TABLET, FILM COATED ORAL at 17:29

## 2023-04-26 RX ADMIN — Medication 12.5 MG: at 08:02

## 2023-04-26 RX ADMIN — TICAGRELOR 90 MG: 90 TABLET ORAL at 08:02

## 2023-04-26 RX ADMIN — Medication 3 MG: at 00:30

## 2023-04-26 RX ADMIN — TICAGRELOR 90 MG: 90 TABLET ORAL at 21:09

## 2023-04-26 RX ADMIN — ENOXAPARIN SODIUM 40 MG: 40 INJECTION SUBCUTANEOUS at 10:00

## 2023-04-26 RX ADMIN — PANTOPRAZOLE SODIUM 20 MG: 20 TABLET, DELAYED RELEASE ORAL at 06:06

## 2023-04-26 RX ADMIN — Medication 12.5 MG: at 21:08

## 2023-04-26 NOTE — QUICK NOTE
40 M PMH of intellectual disability, depression, GERD, presented from home with substernal chest pain and numbness of bilateral upper extremities  Reportedly eating, showering earlier in the day without issue then sat in the chair and felt sudden onset sharp chest pain with numbness of his bilateral upper extremities  He became nauseous and vomited  He then experienced palpitations and shortness of breath lasting only a few minutes  Dialed 911  Upon arrival of EMS ECG showed ST segment elevations in II, III and aVF  There were associated reciprocal changes in V1, V2 and V3  STEMI alert called  The patient was taken to the cath lab  Family history unremarkable  Social history without tobacco, illicit substances  Medical history without hx of DM  The following was found on PCI     Mid RCA lesion is 100% stenosed  Prox Cx lesion is 80% stenosed  1st Mrg lesion is 70% stenosed  The angioplasty was pre-stent angioplasty  DESx3 placed in RCA, prox Cx and OM1  Post intervention, patient admitted to the ICU for post STEMI monitoring  Lab analysis with     Elevated LDL >200  Hypokalemia    STEMI to inferior wall, s/p PCI x3 to above  HLD  Hypokalemia    DAPT   Statin therapy  Replete potassium  Obtain UDS despite denial  Obtain echo  BB to start tomorrow per cardio rec  Consider work up for familial hypercholesterolemia, may need more aggressive lipid management  Check TG in AM while fasting  Check TSH, A1c  Consider PSG as an outpatient  Look for Sleep apnea  Potential CINYD vs CSA    Discussed with night attending, CCM resident

## 2023-04-26 NOTE — DISCHARGE INSTR - AVS FIRST PAGE
Dear Otto Hill,     It was our pleasure to care for you here at [facility]  It is our hope that we were always able to exceed the expected standards for your care during your stay  You were hospitalized due to ***  You were cared for on the *** floor by Ary Henson DO under the service of [address] [place] [address]  with the [facility] who covers for your primary care physician (PCP), Ary Henson DO, while you were hospitalized  If you have any questions or concerns related to this hospitalization, you may contact us at [phone]  For follow up as well as any medication refills, we recommend that you follow up with your primary care physician  A registered nurse will reach out to you by phone within a few days after your discharge to answer any additional questions that you may have after going home  However, at this time we provide for you here, the most important instructions / recommendations at discharge:     Notable Medication Adjustments -   Start Ticagrelor 90 my by mouth daily   Start Eliquis 5 mg by mouth daily   Start Metoprolol succinate 25 mg by mouth daily   Start Lipitor 80 mg by mouth daily  Testing Required after Discharge -   Per   Important follow up information -   Follow up appointment with Cardiology on 05/08/2023  Schedule appointment with your PCP within 1-week from discharge date    Please review this entire after visit summary as additional general instructions including medication list, appointments, activity, diet, any pertinent wound care, and other additional recommendations from your care team that may be provided for you  Sincerely,     Ary Henson DO    1  Please see the post cardiac catheterization/ angioseal closure device instructions and stent booklet  No heavy lifting, greater than 10 lbs  or strenuous  activity for 48 hrs    See the post cardiac catheterization/ angioseal closure device instructions  2 Remove band aid tomorrow  Shower and wash wrist and groin gently with soap and water  Rinse and pat dry  Apply new water seal band aid  Repeat this process for 5 days  No powders, creams lotions or antibiotic ointments  for 5 days  No tub baths, hot tubs or swimming for 5 days  3 No driving for 2 days    4  Do not stop Brilinta (ticagrelor) for any reason without a cardiologist’s consent, or the stent could block up and cause a heart attack

## 2023-04-26 NOTE — ASSESSMENT & PLAN NOTE
-Single reading of Na 132  Resolved  Not clinically significant  Likely in setting of limited intake

## 2023-04-26 NOTE — PROGRESS NOTES
[facility]  Progress Note  Name: Raegan Murphy  MRN: [id number]  Unit/Bed#: ICU 01 I Date of Admission: 4/25/2023   Date of Service: 4/26/2023 I Hospital Day: 1    Assessment/Plan   * STEMI (ST elevation myocardial infarction) [facility])  Assessment & Plan  -Pt experienced chest pain, vomiting, diaphoresis on 4/25  -EKG showed inferior STEMI, taken to cath lab w/ stents to RCA (100% occluded), proximal cx (80% occluded), and OM1 (70% occluded)  -No prior cardiac history  -Denies any current symptoms    Plan  -Started on atorvastatin 80, ASA, Brilinta, Metoprolol tartrate 12 5 BID  -f/u results of echo  -Cardiac monitoring  -O2 as needed    Hyperlipemia  Assessment & Plan  -Pt presented 4/25 w/ inferior MI, now s/p PCI  - on admission  -Pt endorses eating a generally healthy diet including many fruits ,vegetables, and lean proteins  Only occasionally eats fatty or processed meats   -Likely a familial component to pt's HLD    Plan  -Begin high intensity statin therapy w/ atorvastatin 80  -Encourage healthy diet on discharge    Lab Results   Component Value Date    CHOLESTEROL 272 (H) 04/25/2023    CHOLESTEROL 250 (H) 12/07/2021     Lab Results   Component Value Date    HDL 31 (L) 04/25/2023    HDL 32 (L) 12/07/2021     Lab Results   Component Value Date    TRIG 79 04/26/2023    TRIG 125 04/25/2023    TRIG 221 (H) 12/07/2021     Lab Results   Component Value Date    [place] 241 04/25/2023    [place] 218 12/07/2021       Gastroesophageal reflux disease  Assessment & Plan  -Continue home Protonix     Sleep disturbance  Assessment & Plan  -Pt has long history of sleep disorder with unusual sleep patterns, takes Ambien at home long-term      Plan  -Continue home Ambien as long as pt is not hypotensive    Depression  Assessment & Plan  -Continue home celexa    Intellectual disability  Assessment & Plan  -Pt has history of unspecified mild intellectual disability, per chart review is reliant on loved ones for activities such as paying bills, but otherwise is largely independent  -AAO x3    Plan  -Ensure pt understanding of care, re-orient if needed    Hyponatremia  Assessment & Plan  -Single reading of Na 132  Resolved  Not clinically significant  Likely in setting of limited intake           ----------------------------------------------------------------------------------------  HPI/24hr events: Pt reported sudden onset of substernal chest pain at home yesterday, accompanied by nausea, vomiting, and bilateral arm numbness  EKG prior to arrival to the hospital showed ST elevations in leads II, III, and AVF, with depressions in V1, V2, V3  He was taken to the cath lab on arrival [facility] and stents were placed in the RCA, Lcx ,and OM1  When seen at bedside pt stated that he felt well and denied any symptoms other than soreness around cath sites on R wrist and R groin  Disposition: Transfer to [facility] with Telemetry   Code Status: Level 1 - Full Code  ---------------------------------------------------------------------------------------  SUBJECTIVE  Pt notes soreness in R groin and wrist around sites of cath insertion  No CP, SOB, N/V ,or any other symptoms  Review of Systems   Constitutional: Negative for fatigue  Respiratory: Negative for chest tightness and shortness of breath  Cardiovascular: Negative for chest pain and leg swelling  Gastrointestinal: Negative for nausea and vomiting  Musculoskeletal:        Soreness R wrist and R groin surrounding sites where cardiac catheters were inserted   Neurological: Negative for dizziness, weakness, light-headedness and numbness       Review of systems was reviewed and negative unless stated above in HPI/24-hour events   ---------------------------------------------------------------------------------------  OBJECTIVE    Vitals   Vitals:    04/26/23 0600 04/26/23 0700 04/26/23 0720 04/26/23 1100   BP: 106/63 99/60 99/60 93/58   BP Location: "Left arm  Left arm   Pulse: 89 90 82 91   Resp: 13 17  17   Temp:  98 8 °F (37 1 °C)  98 6 °F (37 °C)   TempSrc:  Oral  Oral   SpO2: 99% 99%  98%   Weight:   77 7 kg (171 lb 4 8 oz)    Height:   5' 9\" (1 753 m)      Temp (24hrs), Av 1 °F (36 7 °C), Min:97 6 °F (36 4 °C), Max:98 8 °F (37 1 °C)  Current: Temperature: 98 6 °F (37 °C)          Respiratory:  SpO2: SpO2: 98 %  Nasal Cannula O2 Flow Rate (L/min): 2 L/min    Invasive/non-invasive ventilation settings   Respiratory    Lab Data (Last 4 hours)    None         O2/Vent Data (Last 4 hours)    None                Physical Exam  Constitutional:       General: He is not in acute distress  HENT:      Head: Normocephalic and atraumatic  Right Ear: External ear normal       Left Ear: External ear normal       Nose: Nose normal       Mouth/Throat:      Mouth: Mucous membranes are moist       Pharynx: Oropharynx is clear  Eyes:      Extraocular Movements: Extraocular movements intact  Conjunctiva/sclera: Conjunctivae normal    Cardiovascular:      Rate and Rhythm: Normal rate and regular rhythm  Pulses: Normal pulses  Heart sounds: Normal heart sounds  Pulmonary:      Effort: Pulmonary effort is normal       Breath sounds: Normal breath sounds  Abdominal:      Palpations: Abdomen is soft  Tenderness: There is no abdominal tenderness  Musculoskeletal:      Right lower leg: No edema  Left lower leg: No edema  Skin:     General: Skin is warm and dry  Comments: Mild bruising and tenderness surrounding cath wounds R wrist and R groin   Neurological:      Mental Status: He is alert and oriented to person, place, and time     Psychiatric:         Mood and Affect: Mood normal          Behavior: Behavior normal              Laboratory and Diagnostics:  Results from last 7 days   Lab Units 23  0605 23  1535   WBC Thousand/uL 9 93 10 57*   HEMOGLOBIN g/dL 11 7* 14 2   HEMATOCRIT % 36 3* 44 3   PLATELETS Thousands/uL 247 " "348   NEUTROS PCT % 78* 67   MONOS PCT % 9 7     Results from last 7 days   Lab Units 04/26/23  0605 04/25/23  2103 04/25/23  1535   SODIUM mmol/L 136 132* 135   POTASSIUM mmol/L 4 2 4 5 2 9*   CHLORIDE mmol/L 105 103 106   CO2 mmol/L 26 22 18*   ANION GAP mmol/L 5 7 11   BUN mg/dL 10 13 15   CREATININE mg/dL 0 81 0 84 0 97   CALCIUM mg/dL 8 3* 8 7 8 4   GLUCOSE RANDOM mg/dL 103 100 130     Results from last 7 days   Lab Units 04/25/23  1535   MAGNESIUM mg/dL 1 9      Results from last 7 days   Lab Units 04/25/23  1535   INR  0 99   PTT seconds 26              ABG:    VBG:          Micro        EKG: prior to stenting, ST elevations in II, III, AVF, depressions V1 ,V2, V3  Imaging: none    Intake and Output  I/O       04/24 0701 04/25 0700 04/25 0701  04/26 0700 04/26 0701 04/27 0700    P  O   2     I V  (mL/kg)  1163 3 (15)     IV Piggyback  130     Total Intake(mL/kg)  1295 3 (16 7)     Urine (mL/kg/hr)  900     Stool  0     Total Output  900     Net  +395 3            Unmeasured Stool Occurrence  1 x           Height and Weights   Height: 5' 9\" (175 3 cm)  IBW (Ideal Body Weight): 70 7 kg  Body mass index is 25 3 kg/m²  Weight (last 2 days)     Date/Time Weight    04/26/23 0720 77 7 (171 3)    04/26/23 0227 77 7 (171 3)    04/25/23 1813 80 4 (177 25)    04/25/23 1538 85 4 (188 27)            Nutrition       Diet Orders   (From admission, onward)             Start     Ordered    04/25/23 1816  Diet Cardiac  Diet effective now        References:    Nutrtion Support Algorithm Enteral vs  Parenteral   Question Answer Comment   Diet Type Cardiac    RD to adjust diet per protocol?  Yes        04/25/23 1815                  Active Medications  Scheduled Meds:  Current Facility-Administered Medications   Medication Dose Route Frequency Provider Last Rate   • aspirin  81 mg Oral Daily LUX Reeves     • atorvastatin  80 mg Oral QPM Hieu Villa MD     • citalopram  20 mg Oral Daily Dean Blevins, DO   " "  • enoxaparin  40 mg Subcutaneous Q24H [address] Hernán Santacruz PA-C     • melatonin  3 mg Oral HS Dean Borden DO     • metoprolol tartrate  12 5 mg Oral Q12H [address] Papi Hidalgo, DO     • nitroglycerin  0 4 mg Sublingual Q5 Min PRN Raegan Jasso MD     • pantoprazole  20 mg Oral Early Morning Dean Borden DO     • ticagrelor  90 mg Oral Q12H [address] Meribnano Unger, DO     • zolpidem  10 mg Oral HS PRN Dean Peres DO       Continuous Infusions:     PRN Meds:   nitroglycerin, 0 4 mg, Q5 Min PRN  zolpidem, 10 mg, HS PRN        Invasive Devices Review  Invasive Devices     Peripheral Intravenous Line  Duration           Peripheral IV 04/25/23 Left Antecubital 1 day    Peripheral IV 04/25/23 Left;Ventral (anterior) Forearm 1 day    Peripheral IV 04/26/23 Dorsal (posterior); Left Hand <1 day                Rationale for remaining devices: n/a  ---------------------------------------------------------------------------------------  Advance Directive and Living Will:      Power of :    POLST:    ---------------------------------------------------------------------------------------  Care Time Delivered:   No Critical Care time spent  and stable for downgrade      Kyle Colon DO      Portions of the record may have been created with voice recognition software  Occasional wrong word or \"sound a like\" substitutions may have occurred due to the inherent limitations of voice recognition software    Read the chart carefully and recognize, using context, where substitutions have occurred  "

## 2023-04-26 NOTE — PROGRESS NOTES
"Cardiology Progress Note - [address]  [address] 40 y o  male MRN: [id number]    Unit/Bed#: ICU 01 Encounter: [id number]    Assessment and plan  #1 inferior ST elevation myocardial infarction  #2 coronary artery disease status post PCI of the RCA, circumflex and OM1  #3 familial hyperlipidemia  #4 depression    Recommendations: Patient feels well this morning no angina overnight  No events on telemetry  Continue aspirin, ticagrelor, high intensity statin  Add metoprolol 12 5 mg every 12 today watch pressures closely  He can get up and ambulate later today and move out to [place] [address] telemetry  Echocardiogram pending  Right radial cath site healing well right femoral cath site healing well  He appears to have some mild cognitive dysfunction we will need to make sure we touch base with his parents who he lives with to make sure he has a adequate set up at home to assure compliance with medications  Subjective:    No significant events overnight  Denies any chest pain, shortness of breath, palpitations  Had some nausea post cath  Denies any pain at the access sites    ROS    Objective:   Vitals: Blood pressure 99/60, pulse 90, temperature 98 8 °F (37 1 °C), temperature source Oral, resp  rate 17, height 5' 9\" (1 753 m), weight 77 7 kg (171 lb 4 8 oz), SpO2 99 %  , Body mass index is 25 3 kg/m² ,   Orthostatic Blood Pressures    Flowsheet Row Most Recent Value   Blood Pressure 99/60 filed at 04/26/2023 0700   Patient Position - Orthostatic VS Lying filed at 04/26/2023 4390         Systolic (42DVV), UHB:097 , Min:92 , XFB:965     Diastolic (91KXI), BELKIS:38, Min:53, Max:85      Intake/Output Summary (Last 24 hours) at 4/26/2023 0736  Last data filed at 4/26/2023 0606  Gross per 24 hour   Intake 1295 33 ml   Output 900 ml   Net 395 33 ml     Weight (last 2 days)     Date/Time Weight    04/26/23 0227 77 7 (171 3)    04/25/23 1813 80 4 (177 25)    04/25/23 1538 85 4 (188 27)            Telemetry Review: No significant " arrhythmias seen on telemetry review  EKG personally reviewed by Víctor Mehta DO  Physical Exam  Vitals and nursing note reviewed  Constitutional:       General: He is not in acute distress  Appearance: He is well-developed  HENT:      Head: Normocephalic and atraumatic  Eyes:      Conjunctiva/sclera: Conjunctivae normal       Pupils: Pupils are equal, round, and reactive to light  Cardiovascular:      Rate and Rhythm: Normal rate and regular rhythm  Heart sounds: Normal heart sounds  No murmur heard  No friction rub  Pulmonary:      Effort: Pulmonary effort is normal  No respiratory distress  Breath sounds: Normal breath sounds  No wheezing or rales  Abdominal:      General: Bowel sounds are normal  There is no distension  Palpations: Abdomen is soft  Tenderness: There is no abdominal tenderness  There is no rebound  Musculoskeletal:         General: No tenderness or deformity  Normal range of motion  Cervical back: Neck supple  Skin:     General: Skin is warm and dry  Findings: No erythema  Neurological:      Mental Status: He is alert and oriented to person, place, and time  Cranial Nerves: No cranial nerve deficit             Laboratory Results:        CBC with diff:   Results from last 7 days   Lab Units 04/26/23  0605 04/25/23  1535   WBC Thousand/uL 9 93 10 57*   HEMOGLOBIN g/dL 11 7* 14 2   HEMATOCRIT % 36 3* 44 3   MCV fL 88 87   PLATELETS Thousands/uL 247 348   MCH pg 28 3 28 0   MCHC g/dL 32 2 32 1   RDW % 13 0 12 8   MPV fL 9 1 9 3   NRBC AUTO /100 WBCs 0 0         CMP:  Results from last 7 days   Lab Units 04/26/23  0605 04/25/23 2103 04/25/23  1535   POTASSIUM mmol/L 4 2 4 5 2 9*   CHLORIDE mmol/L 105 103 106   CO2 mmol/L 26 22 18*   BUN mg/dL 10 13 15   CREATININE mg/dL 0 81 0 84 0 97   CALCIUM mg/dL 8 3* 8 7 8 4   EGFR ml/min/1 73sq m 113 111 99         BMP:  Results from last 7 days   Lab Units 04/26/23  0605 04/25/23 2103 04/25/23  1535   POTASSIUM mmol/L 4 2 4 5 2 9*   CHLORIDE mmol/L 105 103 106   CO2 mmol/L 26 22 18*   BUN mg/dL 10 13 15   CREATININE mg/dL 0 81 0 84 0 97   CALCIUM mg/dL 8 3* 8 7 8 4       BNP: No results for input(s): BNP in the last 72 hours  Magnesium:   Results from last 7 days   Lab Units 04/25/23  1535   MAGNESIUM mg/dL 1 9       Coags:   Results from last 7 days   Lab Units 04/25/23  1535   PTT seconds 26   INR  0 99       TSH:        Hemoglobin A1C       Lipid Profile:   Results from last 7 days   Lab Units 04/26/23  0605 04/25/23  1535   TRIGLYCERIDES mg/dL 79 125   HDL mg/dL  --  31*       Cardiac testing:   No results found for this or any previous visit  No results found for this or any previous visit  No results found for this or any previous visit  No results found for this or any previous visit  Meds/Allergies   all current active meds have been reviewed  Medications Prior to Admission   Medication   • citalopram (CeleXA) 20 mg tablet   • omeprazole (PriLOSEC) 20 mg delayed release capsule   • zolpidem (AMBIEN) 10 mg tablet          Assessment:  Principal Problem:    [facility]  Active Problems:    Intellectual disability    Depression    Sleep disturbance    Gastroesophageal reflux disease    Hyperlipemia            Counseling / Coordination of Care  Total floor / unit time spent today 25 minutes  Greater than 50% of total time was spent with the patient and / or family counseling and / or coordination of care  A description of the counseling / coordination of care: Edie Abdul

## 2023-04-26 NOTE — ASSESSMENT & PLAN NOTE
-Pt experienced chest pain, vomiting, diaphoresis on 4/25  -EKG showed inferior STEMI, taken to cath lab w/ stents to RCA (100% occluded), proximal cx (80% occluded), and OM1 (70% occluded)    -No prior cardiac history  -Denies any current symptoms    Plan  -Started on atorvastatin 80, ASA, Brilinta, Metoprolol tartrate 12 5 BID  -f/u results of echo  -Cardiac monitoring  -O2 as needed

## 2023-04-26 NOTE — ASSESSMENT & PLAN NOTE
-Pt has history of unspecified mild intellectual disability, per chart review is reliant on loved ones for activities such as paying bills, but otherwise is largely independent  -AAO x3    Plan  -Ensure pt understanding of care, re-orient if needed

## 2023-04-26 NOTE — ASSESSMENT & PLAN NOTE
-Pt presented 4/25 w/ inferior MI, now s/p PCI  - on admission  -Pt endorses eating a generally healthy diet including many fruits ,vegetables, and lean proteins   Only occasionally eats fatty or processed meats   -Likely a familial component to pt's HLD    Plan  -Begin high intensity statin therapy w/ atorvastatin 80  -Encourage healthy diet on discharge    Lab Results   Component Value Date    CHOLESTEROL 272 (H) 04/25/2023    CHOLESTEROL 250 (H) 12/07/2021     Lab Results   Component Value Date    HDL 31 (L) 04/25/2023    HDL 32 (L) 12/07/2021     Lab Results   Component Value Date    TRIG 79 04/26/2023    TRIG 125 04/25/2023    TRIG 221 (H) 12/07/2021     Lab Results   Component Value Date    [place] 241 04/25/2023    [place] 218 12/07/2021

## 2023-04-26 NOTE — PROGRESS NOTES
Critical Care Interval Transfer Note:    Please refer to progress note from earlier today for full details  Barriers to discharge:   · Telemetry monitoring s/p MI  · Cardiac medication optimization      Consults: IP CONSULT TO CARDIOLOGY    Recommended to review admission imaging for incidental findings and document in discharge navigator: Incidental Na 132  Self resolved  Discharge Plan: Anticipate discharge in 24-48 hrs to home  Patient seen and evaluated by Critical Care today and deemed to be appropriate for transfer to [facility] with Telemetry  Spoke to Dr Sonda Galeazzi from [facility] team to accept transfer  Critical care can be contacted via [employer] with any questions or concerns      Inga Alpers, DO  PGY-1

## 2023-04-26 NOTE — UTILIZATION REVIEW
Initial Clinical Review    Admission: Date/Time/Statement:   Admission Orders (From admission, onward)     Ordered        04/25/23 1920  Inpatient Admission  Once                      Orders Placed This Encounter   Procedures   • Inpatient Admission     Standing Status:   Standing     Number of Occurrences:   1     Order Specific Question:   Level of Care     Answer:   Level 1 Stepdown [13]     Order Specific Question:   Estimated length of stay     Answer:   More than 2 Midnights     Order Specific Question:   Certification     Answer:   I certify that inpatient services are medically necessary for this patient for a duration of greater than two midnights  See H&P and MD Progress Notes for additional information about the patient's course of treatment  ED Arrival Information     Expected   4/25/2023     Arrival   4/25/2023 15:29    Acuity   Emergent            Means of arrival   Ambulance    Escorted by   -    Service   Critical Care/ICU    Admission type   Emergency            Arrival complaint   Aminataap Aqq  291 MI           Chief Complaint   Patient presents with   • Chest Pain     Pt reports sudden chest pain, pre hospital MI alert  No cardiac hx        Initial Presentation: 40 y o  male from home to ED via ems admitted inpatient due to  STEMI  Presented due to chest pain starting about 1 hour prior to arrival with bilateral arm numbness  + nausea, episode of vomiting, palpitations and shortness of breath  Per ems ST segment elevation Leads 2, 3, aVf and reciprocal changes in V1 V2 V3 suggesting inferior MI  On exam appears ill  Tachycardic  Hs tnl 23  K 2 9  Cholesterol 272    Ecg inferior STEMI  In the ED given Heparin bolus, Brilinta,  Cardiology Consulted  To Cath Lab  At Cath Lab found to have complete occlusion of the RCA, 80% occlusion proximal circumflex, and 70% occlusion OM1  PASTORA placed in each of these occluded vessels without any complication    Plan is telemetry, DAPT, statin, beta blocker, echo  4/25/23 per Cardiology - Patient with Inferior STEMI  No family or medical history of CAD   and suspect underlying familial hyperlipidemia  Plan is load with asa, Plavix, ntg and to Cath Lab  Date: 4/26/23    Day 2:  Some nausea post cath  Today no chest pain  Telemetry sinus  Exam Right radial cath site healing well right femoral cath site healing well  Some cognitive dysfunction and history of same  H&H 11 7/36  3  Hs Tnl random 20,882  Continue asa, Brilinta, high intensity statin, add metoprolol and monitor BP  Echo       ED Triage Vitals   Temperature Pulse Respirations Blood Pressure SpO2   04/25/23 1538 04/25/23 1530 04/25/23 1530 04/25/23 1530 04/25/23 1530   97 7 °F (36 5 °C) 103 20 128/85 99 %      Temp Source Heart Rate Source Patient Position - Orthostatic VS BP Location FiO2 (%)   04/25/23 1538 04/25/23 1530 04/25/23 1530 04/25/23 1530 --   Oral Monitor Lying Right arm       Pain Score       04/25/23 1532       No Pain          Wt Readings from Last 1 Encounters:   04/26/23 77 7 kg (171 lb 4 8 oz)     Additional Vital Signs:   04/26/23 0700 98 8 °F (37 1 °C) 90 17 99/60 74 99 % -- -- None (Room air) Lying   04/26/23 0600 -- 89 13 106/63 80 99 % -- -- -- --   04/26/23 0400 -- 75 12 92/55 68 98 % -- -- -- --   04/26/23 0300 -- 82 11 Abnormal  100/61 75 98 % -- -- -- --   04/26/23 0227 98 °F (36 7 °C) 81 11 Abnormal  98/64 75 98 % -- -- None (Room air) Lying   04/26/23 0100 -- 87 11 Abnormal  93/53 67 97 % -- -- -- --   04/26/23 0000 -- 88 11 Abnormal  104/68 81 98 % -- -- -- --   04/25/23 2305 97 7 °F (36 5 °C) -- -- -- -- -- -- -- -- --   04/25/23 2300 -- 85 16 106/69 83 98 % -- -- -- --   04/25/23 2200 -- 94 10 Abnormal  112/74 88 98 % -- -- -- --   04/25/23 2100 -- 96 16 105/66 80 98 % -- -- -- --   04/25/23 2000 -- 103 17 124/70 90 98 % -- -- -- --   04/25/23 1900 -- 102 11 Abnormal  120/81 94 99 % -- -- -- --   04/25/23 1813 97 6 °F (36 4 °C) 103 13 139/78 100 100 % -- -- None (Room air) Lying   04/25/23 15:55:53 -- -- -- -- -- 99 % 28 2 L/min Nasal cannula --   04/25/23 15:40:03 -- 100 20 132/83 -- 95 % -- -- None (Room air)      Pertinent Labs/Diagnostic Test Results:   No orders to display     4/25/23 ecg Ventricular rate 98 bpm, ID interval 162 ms , QRS duration 92 ms, QT   348 ms, QTc 444 ms  There is acute ST segment elevation in leads 2, 3, aVF and reciprocal   depression in V1 V2 V3 suggestive of inferior wall MI   Normal sinus   Rhythm  4/25/23 Cardiac Cath Mid RCA lesion is 100% stenosed  •  Prox Cx lesion is 80% stenosed  •  1st Mrg lesion is 70% stenosed  •  The angioplasty was pre-stent angioplasty  Two-vessel coronary artery disease, with a total occlusion of the mid right coronary artery representing the culprit for the patient's inferior STEMI, and an 80% stenosis of the proximal circumflex artery  Successful IVUS-guided PCI, mid RCA, with reduction in stenosis from 100% to 0% following PCI and placement of a 3 5 24mm drug-eluting stent  Successful IVUS-guided PCI, proximal circumflex artery, with reduction in stenosis from 80% to 0% following placement of a 2 5x15mm PASTORA  Successful PCI, OM1, with reduction stenosis from 70% to 0% following placement of a 2 [id number] PASTORA  Radial access was employed for angiography and RCA PCI  Ascending aortic anatomy made engagement of the left main artery for circumflex PCI difficult, and access was switched to femoral for the circumflex and OM PCI's  Plan: DAPT, statin, beta-blockade, echocardiography, cardiac rehabilitation  The patient's LDL has been markedly elevated in the past and was greater than 200 on the day of admission    This represents his major risk factor for coronary disease    4/26/23 echo Left Ventricle: Left ventricular cavity size is normal  Wall thickness is normal  Systolic function is normal  Wall motion is normal  Diastolic function is normal   •  Right Ventricle: Right ventricular cavity size is normal  Systolic function is normal       Results from last 7 days   Lab Units 04/26/23  0605 04/25/23  1535   WBC Thousand/uL 9 93 10 57*   HEMOGLOBIN g/dL 11 7* 14 2   HEMATOCRIT % 36 3* 44 3   PLATELETS Thousands/uL 247 348   NEUTROS ABS Thousands/µL 7 68* 7 17     Results from last 7 days   Lab Units 04/26/23  0605 04/25/23  2103 04/25/23  1535   SODIUM mmol/L 136 132* 135   POTASSIUM mmol/L 4 2 4 5 2 9*   CHLORIDE mmol/L 105 103 106   CO2 mmol/L 26 22 18*   ANION GAP mmol/L 5 7 11   BUN mg/dL 10 13 15   CREATININE mg/dL 0 81 0 84 0 97   EGFR ml/min/1 73sq m 113 111 99   CALCIUM mg/dL 8 3* 8 7 8 4   MAGNESIUM mg/dL  --   --  1 9     Results from last 7 days   Lab Units 04/26/23  0605 04/25/23  2103 04/25/23  1535   GLUCOSE RANDOM mg/dL 103 100 130     Results from last 7 days   Lab Units 04/25/23  1535   HS TNI 0HR ng/L 23     Results from last 7 days   Lab Units 04/25/23  1535   PROTIME seconds 13 3   INR  0 99   PTT seconds 26     Results from last 7 days   Lab Units 04/25/23  1535   TSH 3RD GENERATON uIU/mL 1 397     Results from last 7 days   Lab Units 04/25/23  2103   AMPH/METH  Negative   BARBITURATE UR  Negative   BENZODIAZEPINE UR  Positive*   COCAINE UR  Negative   METHADONE URINE  Negative   OPIATE UR  Negative   PCP UR  Negative   THC UR  Negative       ED Treatment:   Medication Administration from 04/25/2023 1522 to 04/25/2023 1549       Date/Time Order Dose Route Action Comments     04/25/2023 1535 EDT heparin (porcine) injection 4,000 Units 4,000 Units Intravenous Given --     04/25/2023 1534 EDT ticagrelor (BRILINTA) tablet 180 mg 180 mg Oral Given --        Past Medical History:   Diagnosis Date   • Allergic    • Anxiety    • Depression    • Diverticulitis    • Diverticulitis of colon    • GERD (gastroesophageal reflux disease)    • Insomnia      Present on Admission:  • Hyperlipemia  • STEMI (ST elevation myocardial infarction) ([facility] [address] )  • Depression  • Intellectual disability  • Sleep disturbance  • Gastroesophageal reflux disease      Admitting Diagnosis: Chest pain [R07 9]  [facility] [I21 3]  Age/Sex: 40 y o  male  Admission Orders:  Scheduled Medications:  aspirin, 81 mg, Oral, Daily  atorvastatin, 80 mg, Oral, QPM  citalopram, 20 mg, Oral, Daily  enoxaparin, 40 mg, Subcutaneous, Q24H MANISH  melatonin, 3 mg, Oral, HS  metoprolol tartrate, 12 5 mg, Oral, Q12H MANISH  pantoprazole, 20 mg, Oral, Early Morning  ticagrelor, 90 mg, Oral, Q12H MANISH    ondansetron (ZOFRAN) injection 4 mg  Dose: 4 mg  Freq: Once Route: IV  Start: 04/25/23 2200 End: 04/25/23 2213    potassium chloride (K-DUR,KLOR-CON) CR tablet 40 mEq  Dose: 40 mEq  Freq: Once Route: PO  Start: 04/25/23 1615 End: 04/25/23 1828    potassium chloride (K-DUR,KLOR-CON) CR tablet 40 mEq  Dose: 40 mEq  Freq: Once Route: PO  Start: 04/25/23 2100 End: 04/25/23 2004    potassium chloride 20 mEq IVPB (premix)  Dose: 20 mEq  Freq: Every 2 hours Route: IV  Last Dose: Stopped (04/25/23 2144)  Start: 04/25/23 1830 End: 04/25/23 2144    Continuous IV Infusions:  sodium chloride 0 9 % infusion  Rate: 100 mL/hr Dose: 100 mL/hr  Freq: Continuous Route: IV  Indications of Use: IV Hydration  Last Dose: Stopped (04/26/23 0606)  Start: 04/25/23 1830 End: 04/26/23 0227     PRN Meds:  nitroglycerin, 0 4 mg, Sublingual, Q5 Min PRN  zolpidem, 10 mg, Oral, HS PRN x 1 4/25/23     Telemetry   Oxygen 2 liters maintain sat > 92%     IP CONSULT TO CARDIOLOGY    Network Utilization Review Department  ATTENTION: Please call with any questions or concerns to [phone] and carefully listen to the prompts so that you are directed to the right person  All voicemails are confidential   Hilda Pratt all requests for admission clinical reviews, approved or denied determinations and any other requests to dedicated fax number below belonging to the campus where the patient is receiving treatment   List of dedicated fax numbers for the Facilities:  [address] [place] [address] DENIALS (Administrative/Medical Necessity) [phone]   [address] (Maternity/NICU/Pediatrics) [phone]   [address] [phone]   [address] [phone]   [address] Beth Mccurdy [facility] 134 405 [facility] [phone]

## 2023-04-27 ENCOUNTER — APPOINTMENT (INPATIENT)
Dept: VASCULAR ULTRASOUND | Facility: HOSPITAL | Age: 38
End: 2023-04-27

## 2023-04-27 VITALS
OXYGEN SATURATION: 98 % | HEIGHT: 69 IN | DIASTOLIC BLOOD PRESSURE: 61 MMHG | WEIGHT: 171.3 LBS | RESPIRATION RATE: 20 BRPM | HEART RATE: 83 BPM | TEMPERATURE: 97.8 F | BODY MASS INDEX: 25.37 KG/M2 | SYSTOLIC BLOOD PRESSURE: 107 MMHG

## 2023-04-27 LAB
ANION GAP SERPL CALCULATED.3IONS-SCNC: 4 MMOL/L (ref 4–13)
ATRIAL RATE: 100 BPM
ATRIAL RATE: 95 BPM
ATRIAL RATE: 98 BPM
BASOPHILS # BLD AUTO: 0.02 THOUSANDS/ΜL (ref 0–0.1)
BASOPHILS NFR BLD AUTO: 0 % (ref 0–1)
BUN SERPL-MCNC: 13 MG/DL (ref 5–25)
CALCIUM SERPL-MCNC: 9.4 MG/DL (ref 8.4–10.2)
CHLORIDE SERPL-SCNC: 106 MMOL/L (ref 96–108)
CO2 SERPL-SCNC: 25 MMOL/L (ref 21–32)
CREAT SERPL-MCNC: 0.8 MG/DL (ref 0.6–1.3)
EOSINOPHIL # BLD AUTO: 0.05 THOUSAND/ΜL (ref 0–0.61)
EOSINOPHIL NFR BLD AUTO: 1 % (ref 0–6)
ERYTHROCYTE [DISTWIDTH] IN BLOOD BY AUTOMATED COUNT: 13 % (ref 11.6–15.1)
GFR SERPL CREATININE-BSD FRML MDRD: 114 ML/MIN/1.73SQ M
GLUCOSE SERPL-MCNC: 92 MG/DL (ref 65–140)
HCT VFR BLD AUTO: 38.2 % (ref 36.5–49.3)
HGB BLD-MCNC: 12.4 G/DL (ref 12–17)
IMM GRANULOCYTES # BLD AUTO: 0.03 THOUSAND/UL (ref 0–0.2)
IMM GRANULOCYTES NFR BLD AUTO: 0 % (ref 0–2)
LYMPHOCYTES # BLD AUTO: 1.59 THOUSANDS/ΜL (ref 0.6–4.47)
LYMPHOCYTES NFR BLD AUTO: 16 % (ref 14–44)
MCH RBC QN AUTO: 28.3 PG (ref 26.8–34.3)
MCHC RBC AUTO-ENTMCNC: 32.5 G/DL (ref 31.4–37.4)
MCV RBC AUTO: 87 FL (ref 82–98)
MONOCYTES # BLD AUTO: 0.96 THOUSAND/ΜL (ref 0.17–1.22)
MONOCYTES NFR BLD AUTO: 10 % (ref 4–12)
NEUTROPHILS # BLD AUTO: 7.06 THOUSANDS/ΜL (ref 1.85–7.62)
NEUTS SEG NFR BLD AUTO: 73 % (ref 43–75)
NRBC BLD AUTO-RTO: 0 /100 WBCS
P AXIS: 48 DEGREES
P AXIS: 48 DEGREES
P AXIS: 52 DEGREES
PLATELET # BLD AUTO: 258 THOUSANDS/UL (ref 149–390)
PMV BLD AUTO: 9.3 FL (ref 8.9–12.7)
POTASSIUM SERPL-SCNC: 4.1 MMOL/L (ref 3.5–5.3)
PR INTERVAL: 156 MS
PR INTERVAL: 162 MS
PR INTERVAL: 166 MS
QRS AXIS: -12 DEGREES
QRS AXIS: -12 DEGREES
QRS AXIS: 31 DEGREES
QRSD INTERVAL: 78 MS
QRSD INTERVAL: 84 MS
QRSD INTERVAL: 92 MS
QT INTERVAL: 340 MS
QT INTERVAL: 348 MS
QT INTERVAL: 348 MS
QTC INTERVAL: 427 MS
QTC INTERVAL: 444 MS
QTC INTERVAL: 448 MS
RBC # BLD AUTO: 4.38 MILLION/UL (ref 3.88–5.62)
SODIUM SERPL-SCNC: 135 MMOL/L (ref 135–147)
T WAVE AXIS: 61 DEGREES
T WAVE AXIS: 65 DEGREES
T WAVE AXIS: 95 DEGREES
VENTRICULAR RATE: 100 BPM
VENTRICULAR RATE: 95 BPM
VENTRICULAR RATE: 98 BPM
WBC # BLD AUTO: 9.71 THOUSAND/UL (ref 4.31–10.16)

## 2023-04-27 RX ORDER — NITROGLYCERIN 0.4 MG/1
0.4 TABLET SUBLINGUAL
Qty: 30 TABLET | Refills: 0 | Status: SHIPPED | OUTPATIENT
Start: 2023-04-27

## 2023-04-27 RX ORDER — METOPROLOL SUCCINATE 25 MG/1
25 TABLET, EXTENDED RELEASE ORAL DAILY
Status: DISCONTINUED | OUTPATIENT
Start: 2023-04-27 | End: 2023-04-27 | Stop reason: HOSPADM

## 2023-04-27 RX ORDER — CLOPIDOGREL BISULFATE 75 MG/1
75 TABLET ORAL DAILY
Status: CANCELLED | OUTPATIENT
Start: 2023-04-28

## 2023-04-27 RX ORDER — METOPROLOL SUCCINATE 25 MG/1
25 TABLET, EXTENDED RELEASE ORAL DAILY
Qty: 30 TABLET | Refills: 0 | Status: SHIPPED | OUTPATIENT
Start: 2023-04-28 | End: 2023-05-28

## 2023-04-27 RX ORDER — CLOPIDOGREL BISULFATE 75 MG/1
600 TABLET ORAL ONCE
Status: CANCELLED | OUTPATIENT
Start: 2023-04-27

## 2023-04-27 RX ORDER — ATORVASTATIN CALCIUM 80 MG/1
80 TABLET, FILM COATED ORAL EVERY EVENING
Qty: 30 TABLET | Refills: 0 | Status: SHIPPED | OUTPATIENT
Start: 2023-04-27 | End: 2023-05-27

## 2023-04-27 RX ADMIN — METOPROLOL SUCCINATE 25 MG: 25 TABLET, EXTENDED RELEASE ORAL at 11:25

## 2023-04-27 RX ADMIN — CITALOPRAM HYDROBROMIDE 20 MG: 20 TABLET ORAL at 08:21

## 2023-04-27 RX ADMIN — TICAGRELOR 90 MG: 90 TABLET ORAL at 08:21

## 2023-04-27 RX ADMIN — ASPIRIN 81 MG: 81 TABLET, CHEWABLE ORAL at 08:21

## 2023-04-27 RX ADMIN — APIXABAN 10 MG: 5 TABLET, FILM COATED ORAL at 14:26

## 2023-04-27 RX ADMIN — Medication 12.5 MG: at 08:21

## 2023-04-27 RX ADMIN — ENOXAPARIN SODIUM 40 MG: 40 INJECTION SUBCUTANEOUS at 08:22

## 2023-04-27 NOTE — DISCHARGE SUMMARY
[facility]  Discharge- 41 Pooja Alan 1985, 40 y o  male MRN: [id number]  Unit/Bed#: S -01 Encounter: [id number]  Primary Care Provider: Mary Fountain DO   Date and time admitted to hospital: 4/25/2023  3:29 PM    * STEMI (ST elevation myocardial infarction) [facility])  Assessment & Plan  (04/24-04/26): POA 80-year-old male patient presented to ED as a pre-hospital MI alert; brought in by EMS, for which they obtained his initial ECG: inferior ST elevations with anterior reciprocal changes; he was given 324 mg of aspirin and sublingual nitroglycerin-resolution of his symptoms; in ED, his EKG again demonstrated inferior ST elevations; provided 4000 units IV heparin 180 mg ticagrelor before being taken urgently to the Cath Lab; PCI findings: Mid RCA lesion is 100% stenosed, proximal circumflex lesion 80% stenosed, first marginal lesion 70% stenosed, PASTORA x3 placed in RCA proximal circumflex and OM1; admitted to ICU for post STEMI monitoring; patient was then sent to [facility] after clearance from ICU     (04/27): Patient stable; telemetry reviewed no overnight events; patient reporting slight discomfort with ambulation-attributes to femoral site access; cardiology recommends continuing DAPT with aspirin ticagrelor, high intensity statin with rosuvastatin 40 mg at discharge, changed to Toprol tartrate metoprolol succinate 25 mg daily at discharge; cardiac rehab referral placed; education provided to the patient to have medication adherence; most likely will be discharged later on this afternoon; cardiology follow-up arranged for 05/08/2023  Hyponatremia-resolved as of 4/28/2023  Assessment & Plan  (04/25): Mild hyponatremia - resolved    Hyperlipemia  Assessment & Plan  (04/25-27) Elevated LDL at 216 - previously at 174 (12/07/2021);  Patient is Post-Cath (see above) and will be on high intensity statin as recommended by Cardiology; continue Lipitor at discharge; follow up with PCP within 1-week from discharge date  Gastroesophageal reflux disease  Assessment & Plan  Continue Prilosec 20 mg ER at Breakfast daily  Sleep disturbance  Assessment & Plan  (04/2023): Patient has long history of sleep disorder; currently requiring 10 mg Ambien per night; continue regimen at discharge; follow up with PCP  Depression  Assessment & Plan  Continue Celexa/Citalopram 20 mg daily  Intellectual disability  Assessment & Plan  History of intellectual disability; follow up with PCP  Discharging Resident Physician: Caio Landon DO  Attending: No att  providers found  PCP: Caio Landon DO  Admission Date: 4/25/2023  Discharge Date: 04/27/23    Disposition:     Home    Reason for Admission: Chest pain/MI    Consultations During Hospital Stay:  · Cardiology   · Critical Care / ICU    Procedures Performed:     · PCI/CATH with PASTORA x 3     Significant Findings / Test Results:     · PCI: Mid RCA lesion is 100% stenosed; Prox Cx lesion is 80% stenosed; 1st Mrg lesion is 70% stenosed  Incidental Findings:   · VAS pseudoaneurysm study: Right-sided       Test Results Pending at Discharge (will require follow up):   · NONE     Outpatient Tests Requested:  · BMP  · CBC    Complications:  NONE    Hospital Course:     Tristin Sinha is a 40 y o  male patient who originally presented to the hospital on 4/25/2023 due to chest pain  Patient [facility]ED with substernal chest pain with associated symptom of numbness in his bilateral upper extremities, nausea,1-episode of vomiting, and shortness of breath  Patient was brought in by EMS where his EKG showed ST elevation myocardial elevation (STEMI) in the inferior leads  Symptoms resolved with 1 dose of aspirin and nitroglycerin    Patient was immediately evaluated in ED and a STEMI alert was called; cardiology was consulted and he was taken hastily to the cardiac catheterization lab where complete occulusion of the RCA (100%), "proximal circumflex *80%), and OM1 (70%) was visualized  PASTORA were placed in each of the occluded vessels without any complication  Cardiology recommended initiating DAPT, statin, beta-blockade, and obtaining a post-cath ECHO after procedure  Post-procedure day 1, the patient reported sharp pain in his right groin and a VAS pseudoaneurysm study was obtained: Evidence of focal, non-occlusive acute vs subacute thrombus was noted in his right common femoral vein  The patient was determined to need triple therapy at discharge and will have close follow up with his PCP and Cardiologist   Patient's parents were notified about the appointments, medications, and need for cardiac rehab for which they acknowledged the treatment plan         Condition at Discharge: stable     Discharge Day Visit / Exam:     Subjective:  Reports sharp pain in his groin after getting to use restroom  Says it was 10/10 and occurring at the cath insertion site  Denies any other symptoms this AM    Vitals: Blood Pressure: 107/61 (04/27/23 1125)  Pulse: 83 (04/27/23 1125)  Temperature: 97 8 °F (36 6 °C) (04/27/23 0737)  Temp Source: Oral (04/27/23 0737)  Respirations: 20 (04/27/23 0737)  Height: 5' 9\" (175 3 cm) (04/26/23 0720)  Weight - Scale: 77 7 kg (171 lb 4 8 oz) (04/26/23 0720)  SpO2: 98 % (04/27/23 0737)  Exam:   Physical Exam  Vitals and nursing note reviewed  Constitutional:       General: He is not in acute distress  Appearance: He is well-developed  HENT:      Head: Normocephalic and atraumatic  Right Ear: External ear normal       Left Ear: External ear normal       Nose: Nose normal       Mouth/Throat:      Mouth: Mucous membranes are moist    Eyes:      Extraocular Movements: Extraocular movements intact  Conjunctiva/sclera: Conjunctivae normal    Neck:      Vascular: No carotid bruit  Cardiovascular:      Rate and Rhythm: Normal rate and regular rhythm  Pulses: Normal pulses        Heart sounds: Normal " heart sounds  No murmur heard  Pulmonary:      Effort: Pulmonary effort is normal       Breath sounds: Normal breath sounds  No wheezing  Abdominal:      General: Abdomen is flat  Bowel sounds are normal       Palpations: Abdomen is soft  Tenderness: There is no right CVA tenderness, left CVA tenderness or guarding  Musculoskeletal:         General: Normal range of motion  Cervical back: Normal range of motion and neck supple  No tenderness  Right lower leg: No edema  Left lower leg: No edema  Comments: Right radial cath site- dressing C/D/I  Mild ecchymoses with hematoma formation   Skin:     General: Skin is warm  Capillary Refill: Capillary refill takes less than 2 seconds  Neurological:      General: No focal deficit present  Mental Status: He is alert  Motor: No weakness  Psychiatric:         Mood and Affect: Mood normal          Behavior: Behavior normal        Discussion with Family: Parents    Discharge instructions/Information to patient and family:   See after visit summary for information provided to patient and family  Provisions for Follow-Up Care:  See after visit summary for information related to follow-up care and any pertinent home health orders  Planned Readmission: NONE      Discharge Medications:  See after visit summary for reconciled discharge medications provided to patient and family        ** Please Note: This note has been constructed using a voice recognition system **

## 2023-04-27 NOTE — ASSESSMENT & PLAN NOTE
(04/24-04/26): POA 51-year-old male patient presented to ED as a pre-hospital MI alert; brought in by EMS, for which they obtained his initial ECG: inferior ST elevations with anterior reciprocal changes; he was given 324 mg of aspirin and sublingual nitroglycerin-resolution of his symptoms; in ED, his EKG again demonstrated inferior ST elevations; provided 4000 units IV heparin 180 mg ticagrelor before being taken urgently to the Cath Lab; PCI findings: Mid RCA lesion is 100% stenosed, proximal circumflex lesion 80% stenosed, first marginal lesion 70% stenosed, PASTORA x3 placed in RCA proximal circumflex and OM1; admitted to ICU for post STEMI monitoring; patient was then sent to [facility] after clearance from ICU     (04/27): Patient stable; telemetry reviewed no overnight events; patient reporting slight discomfort with ambulation-attributes to femoral site access; cardiology recommends continuing DAPT with aspirin ticagrelor, high intensity statin with rosuvastatin 40 mg at discharge, changed to Toprol tartrate metoprolol succinate 25 mg daily at discharge; cardiac rehab referral placed; education provided to the patient to have medication adherence; most likely will be discharged later on this afternoon; cardiology follow-up arranged for 05/08/2023

## 2023-04-27 NOTE — PROGRESS NOTES
General Cardiology   Progress Note -  Team One   Nany Winn 40 y o  male MRN: [id number]  Unit/Bed#: S -01 Encounter: [id number]    Assessment     1  Inferior STEMI  Presented with sudden onset chest pain, diaphoresis, and vomiting  ECG showed inferior ST elevations and taken urgently to the cath lab on 4/25 with 2 vessel CAD  Culprit was 100% mid RCA occlusion s/p PASTORA placement  Also underwent PCI of LCx and OM1  DAPT with ASA and ticagrelor  Started on high intensity statin and low dose beta blocker  LVEF normal, trace MR, trace TR      2  Familial hyperlipidemia- , , HDL 31,   LDL elevated back in 2021 as well  Not on statin therapy PTA      3  Depression- on Celexa    4  Intellectual disability- overall very independent, helps care for his dad at home  Parents pay bills and he does not drive       Plan  1  Continue DAPT with ASA and ticagrelor- Rx sent to pharmacy to check cost  2  Continue high intensity statin- rosuvastatin 40 mg at discharge  3  Change metoprolol tartrate to metoprolol succinate 25 mg daily for discharge  4  Cardiac rehab referral sent  5  Discussed with patient the importance of taking his medications  He verbalized understanding and has been looking information up online to help care for himself going forward  I've reached out to CM to discuss transportation arrangements for follow up appointments since he does not drive  6  Should be stable for discharge home later today; tells me his sister can pick him up after work  7  Follow up arranged for 5/8/23  Subjective  Review of Systems   Constitutional: Negative for chills, malaise/fatigue and weight gain  Cardiovascular: Negative for chest pain, dyspnea on exertion, leg swelling, orthopnea, palpitations and syncope  Respiratory: Negative for cough, shortness of breath, sleep disturbances due to breathing and sputum production  Gastrointestinal: Negative for bloating, nausea and vomiting  "  Neurological: Negative for dizziness, light-headedness and weakness  Psychiatric/Behavioral: Negative for altered mental status  All other systems reviewed and are negative  Objective:   Vitals: Blood pressure 119/67, pulse 92, temperature 97 8 °F (36 6 °C), temperature source Oral, resp  rate 20, height 5' 9\" (1 753 m), weight 77 7 kg (171 lb 4 8 oz), SpO2 98 %  , Body mass index is 25 3 kg/m²  ,     Systolic (68UOA), NNB:26 , Min:83 , UUO:915     Diastolic (62PRX), KHF:41, Min:49, Max:69      Intake/Output Summary (Last 24 hours) at 4/27/2023 0906  Last data filed at 4/27/2023 0222  Gross per 24 hour   Intake 400 ml   Output 2750 ml   Net -2350 ml     Wt Readings from Last 3 Encounters:   04/26/23 77 7 kg (171 lb 4 8 oz)   03/28/23 82 4 kg (181 lb 9 6 oz)   02/28/23 81 7 kg (180 lb 3 2 oz)     Telemetry Review: NSR and sinus tachycardia in the 100s    Physical Exam  Vitals reviewed  Constitutional:       General: He is not in acute distress  Neck:      Vascular: No hepatojugular reflux or JVD  Cardiovascular:      Rate and Rhythm: Regular rhythm  Tachycardia present  Pulses: Normal pulses  Heart sounds: Normal heart sounds  No murmur heard  No friction rub  No gallop  Pulmonary:      Effort: Pulmonary effort is normal  No respiratory distress  Breath sounds: Normal breath sounds  No rales  Comments: Room air  Abdominal:      General: Bowel sounds are normal  There is no distension  Palpations: Abdomen is soft  Tenderness: There is no abdominal tenderness  Musculoskeletal:         General: No tenderness  Normal range of motion  Cervical back: Neck supple  Right lower leg: No edema  Left lower leg: No edema  Skin:     General: Skin is warm and dry  Capillary Refill: Capillary refill takes less than 2 seconds  Findings: No erythema  Comments: Right radial cath site- dressing C/D/I  No hematoma or erythema    Right femoral cath site- " dressing C/D/I  Healing well  Mild ecchymosis but no hematoma  Neurological:      Mental Status: He is alert and oriented to person, place, and time     Psychiatric:         Mood and Affect: Mood normal        LABORATORY RESULTS      CBC with diff:   Results from last 7 days   Lab Units 04/27/23 0243 04/26/23 0605 04/25/23  1535   WBC Thousand/uL 9 71 9 93 10 57*   HEMOGLOBIN g/dL 12 4 11 7* 14 2   HEMATOCRIT % 38 2 36 3* 44 3   MCV fL 87 88 87   PLATELETS Thousands/uL 258 247 348   MCH pg 28 3 28 3 28 0   MCHC g/dL 32 5 32 2 32 1   RDW % 13 0 13 0 12 8   MPV fL 9 3 9 1 9 3   NRBC AUTO /100 WBCs 0 0 0     CMP:  Results from last 7 days   Lab Units 04/27/23 0243 04/26/23 0605 04/25/23 2103 04/25/23  1535   POTASSIUM mmol/L 4 1 4 2 4 5 2 9*   CHLORIDE mmol/L 106 105 103 106   CO2 mmol/L 25 26 22 18*   BUN mg/dL 13 10 13 15   CREATININE mg/dL 0 80 0 81 0 84 0 97   CALCIUM mg/dL 9 4 8 3* 8 7 8 4   EGFR ml/min/1 73sq m 114 113 111 99     BMP:  Results from last 7 days   Lab Units 04/27/23 0243 04/26/23 0605 04/25/23 2103 04/25/23  1535   POTASSIUM mmol/L 4 1 4 2 4 5 2 9*   CHLORIDE mmol/L 106 105 103 106   CO2 mmol/L 25 26 22 18*   BUN mg/dL 13 10 13 15   CREATININE mg/dL 0 80 0 81 0 84 0 97   CALCIUM mg/dL 9 4 8 3* 8 7 8 4     Lab Results   Component Value Date    CREATININE 0 80 04/27/2023    CREATININE 0 81 04/26/2023    CREATININE 0 84 04/25/2023     Results from last 7 days   Lab Units 04/25/23  1535   MAGNESIUM mg/dL 1 9      Results from last 7 days   Lab Units 04/25/23  1535   TSH 3RD GENERATON uIU/mL 1 397     Results from last 7 days   Lab Units 04/25/23  1535   INR  0 99     Lipid Profile:   No results found for: CHOL  Lab Results   Component Value Date    HDL 31 (L) 04/25/2023    HDL 32 (L) 12/07/2021     Lab Results   Component Value Date    LDLCALC 216 (H) 04/25/2023    LDLCALC 174 (H) 12/07/2021     Lab Results   Component Value Date    TRIG 79 04/26/2023    TRIG 125 04/25/2023    TRIG 221 (H) 12/07/2021     Meds/Allergies   all current active meds have been reviewed and current meds:   Current Facility-Administered Medications   Medication Dose Route Frequency   • aspirin chewable tablet 81 mg  81 mg Oral Daily   • atorvastatin (LIPITOR) tablet 80 mg  80 mg Oral QPM   • citalopram (CeleXA) tablet 20 mg  20 mg Oral Daily   • enoxaparin (LOVENOX) subcutaneous injection 40 mg  40 mg Subcutaneous Q24H MANISH   • melatonin tablet 3 mg  3 mg Oral HS   • metoprolol tartrate (LOPRESSOR) partial tablet 12 5 mg  12 5 mg Oral Q12H MANISH   • nitroglycerin (NITROSTAT) SL tablet 0 4 mg  0 4 mg Sublingual Q5 Min PRN   • pantoprazole (PROTONIX) EC tablet 20 mg  20 mg Oral Early Morning   • ticagrelor (BRILINTA) tablet 90 mg  90 mg Oral Q12H [address]   • zolpidem (AMBIEN) tablet 10 mg  10 mg Oral HS PRN     Medications Prior to Admission   Medication   • citalopram (CeleXA) 20 mg tablet   • omeprazole (PriLOSEC) 20 mg delayed release capsule   • zolpidem (AMBIEN) 10 mg tablet     Counseling / Coordination of Care  Total floor / unit time spent today 20 minutes  Greater than 50% of total time was spent with the patient and / or family counseling and / or coordination of care  ** Please Note: Dragon 360 Dictation voice to text software may have been used in the creation of this document   **

## 2023-04-27 NOTE — UTILIZATION REVIEW
NOTIFICATION OF INPATIENT ADMISSION   AUTHORIZATION REQUEST   SERVICING FACILITY:   [address] [facility] [place], [address] [place] [address]  Tax ID: [id number]  NPI: [id number]   ATTENDING PROVIDER:  Attending Name and NPI#: Homero Garber [[id number]]  Address: [address] [place] [address] [address] [facility] [address] [place] [address]  Phone: [phone]     ADMISSION INFORMATION:  [facility]patient [facility]  Place of Service Code: 21  Inpatient Admission Date/Time: 4/25/23  6:13 PM  Discharge Date/Time: No discharge date for patient encounter  Admitting Diagnosis Code/Description:  Chest pain [R07 9]  [facility] [I21 3]     UTILIZATION REVIEW CONTACT:  Lincoln Barahona Utilization   Network Utilization Review Department  Phone: [phone]  Fax: [phone]  Email: [email or web address]  Contact for approvals/pending authorizations, clinical reviews, and discharge  PHYSICIAN ADVISORY SERVICES:  Medical Necessity Denial & [employer] Review  Phone: [phone]  Fax: [phone]  Email: [email or web address]

## 2023-04-27 NOTE — DISCHARGE SUMMARY
[facility]  Discharge- [address] Pooja Alan 1985, 40 y o  male MRN: [id number]  Unit/Bed#: S -01 Encounter: [id number]  Primary Care Provider: Elvin Cooley DO   Date and time admitted to hospital: 4/25/2023  3:29 PM    * STEMI (ST elevation myocardial infarction) [facility])  Assessment & Plan  (04/24-04/26): POA 72-year-old male patient presented to ED as a pre-hospital MI alert; brought in by EMS, for which they obtained his initial ECG: inferior ST elevations with anterior reciprocal changes; he was given 324 mg of aspirin and sublingual nitroglycerin-resolution of his symptoms; in ED, his EKG again demonstrated inferior ST elevations; provided 4000 units IV heparin 180 mg ticagrelor before being taken urgently to the Cath Lab; PCI findings: Mid RCA lesion is 100% stenosed, proximal circumflex lesion 80% stenosed, first marginal lesion 70% stenosed, PASTORA x3 placed in RCA proximal circumflex and OM1; admitted to ICU for post STEMI monitoring; patient was then sent to [facility] after clearance from ICU     (04/27): Patient stable; telemetry reviewed no overnight events; patient reporting slight discomfort with ambulation-attributes to femoral site access; cardiology recommends continuing DAPT with aspirin ticagrelor, high intensity statin with rosuvastatin 40 mg at discharge, changed to Toprol tartrate metoprolol succinate 25 mg daily at discharge; cardiac rehab referral placed; education provided to the patient to have medication adherence; most likely will be discharged later on this afternoon; cardiology follow-up arranged for 05/08/2023          Discharging Resident Physician: Elvin Cooley DO  Attending: Sanjay Lee DO  PCP: Elvin Cooley DO  Admission Date: 4/25/2023  Discharge Date: 04/27/23    Disposition:     Home    Reason for Admission: Chest pain/MI    Consultations During Hospital Stay:  · Cardiology   · Critical Care / ICU    Procedures Performed:     · PCI/CATH "with PASTORA x 3     Significant Findings / Test Results:     ·     Incidental Findings:   · ***     Test Results Pending at Discharge (will require follow up):   · ***     Outpatient Tests Requested:  · ***    Complications:  ***    Hospital Course:     Tereza Carpenter is a 40 y o  male patient who originally presented to the hospital on 4/25/2023 due to ***    Condition at Discharge: {condition:55123}     Discharge Day Visit / Exam:     Subjective:  ***  Vitals: Blood Pressure: 119/67 (04/27/23 0737)  Pulse: 92 (04/27/23 0737)  Temperature: 97 8 °F (36 6 °C) (04/27/23 0737)  Temp Source: Oral (04/27/23 0737)  Respirations: 20 (04/27/23 0737)  Height: 5' 9\" (175 3 cm) (04/26/23 0720)  Weight - Scale: 77 7 kg (171 lb 4 8 oz) (04/26/23 0720)  SpO2: 98 % (04/27/23 0737)  Exam:   Physical Exam  Vitals reviewed  Constitutional:       General: He is not in acute distress  Neck:      Vascular: No hepatojugular reflux or JVD  Cardiovascular:      Rate and Rhythm: Regular rhythm  Tachycardia present  Pulses: Normal pulses  Heart sounds: Normal heart sounds  No murmur heard  No friction rub  No gallop  Pulmonary:      Effort: Pulmonary effort is normal  No respiratory distress  Breath sounds: Normal breath sounds  No rales  Comments: Room air  Abdominal:      General: Bowel sounds are normal  There is no distension  Palpations: Abdomen is soft  Tenderness: There is no abdominal tenderness  Musculoskeletal:         General: No tenderness  Normal range of motion  Cervical back: Neck supple  Right lower leg: No edema  Left lower leg: No edema  Skin:     General: Skin is warm and dry  Capillary Refill: Capillary refill takes less than 2 seconds  Findings: No erythema  Comments: Right radial cath site- dressing C/D/I  No hematoma or erythema  Right femoral cath site- dressing C/D/I  Healing well  Mild ecchymosis but no hematoma     Neurological:      Mental " Status: He is alert and oriented to person, place, and time  Psychiatric:         Mood and Affect: Mood normal        ( *** Be Sure to Include Physical Exam: Delete this entire line when you have entered your exam)  Discussion with Family: ***    Discharge instructions/Information to patient and family:   See after visit summary for information provided to patient and family  Provisions for Follow-Up Care:  See after visit summary for information related to follow-up care and any pertinent home health orders  Planned Readmission: ***     Discharge Medications:  See after visit summary for reconciled discharge medications provided to patient and family        ** Please Note: This note has been constructed using a voice recognition system **

## 2023-04-27 NOTE — CASE MANAGEMENT
Case Management Assessment & Discharge Planning Note    Patient name Reese Gonzalez  Location S /S -01 MRN [id number]  : 1985 Date 2023       Current Admission Date: 2023  Current Admission Diagnosis:STEMI (ST elevation myocardial infarction) [facility])   Patient Active Problem List    Diagnosis Date Noted   • Hyponatremia 2023   • STEMI (ST elevation myocardial infarction) ([facility] ) 2023   • Gastroesophageal reflux disease 2022   • Hyperlipemia 2022   • Intellectual disability 2021   • BMI 27 0-27 9,adult 2021   • Depression 2021   • Sleep disturbance 2021      LOS (days): 2  Geometric Mean LOS (GMLOS) (days): 2 20  Days to GMLOS:0 5     OBJECTIVE:    Risk of Unplanned Readmission Score: 8 65         Current admission status: Inpatient       Preferred Pharmacy:   [facility]egan Restrepo [address] [place] [id number]  Phone: [phone] Fax: [phone]    Primary Care Provider: Araceli Blanco DO    Primary Insurance: [address] [place] [address],[address] [place] [address]  Secondary Insurance: [address] [place] [address]    ASSESSMENT:    Readmission Root Cause  30 Day Readmission: No    Patient Information  Admitted from[de-identified] Home  Mental Status: Alert  During Assessment patient was accompanied by: Not accompanied during assessment  Assessment information provided by[de-identified] Patient  Primary Caregiver: Self  Support Systems: Self, Parent, Family members, Angeli Dunham 61 of Residence: [address] [place] [address] do you live in?: [place]  In the last 12 months, was there a time when you were not able to pay the mortgage or rent on time?: No  In the last 12 months, how many places have you lived?: 1  In the last 12 months, was there a time when you did not have a steady place to sleep or slept in a shelter (including now)?: No  Homeless/housing insecurity resource given?: No  Living Arrangements: Lives INR in goal range at 2.6  CPM coumadin 6mg daily. Next INR on home meter in 2 weeks. Patient notified. w/ Parent(s)  Is patient a ?: No    Activities of Daily Living Prior to Admission  Functional Status: Independent  Completes ADLs independently?: Yes  Ambulates independently?: Yes  Does patient use assisted devices?: No  Does patient currently own DME?: No  Does patient have a history of Outpatient Therapy (PT/OT)?: No  Does the patient have a history of Short-Term Rehab?: No  Does patient have a history of HHC?: No  Does patient currently have [facility]?: No    Patient Information Continued  Income Source: Employed  Does patient have prescription coverage?: Yes  Within the past 12 months, you worried that your food would run out before you got the money to buy more : Never true  Within the past 12 months, the food you bought just didn't last and you didn't have money to get more : Never true  Food insecurity resource given?: No  Does patient receive dialysis treatments?: No  Does patient have a history of substance abuse?: No  Does patient have a history of Mental Health Diagnosis?: Yes (Depression)  Is patient receiving treatment for mental health?: Yes  Has patient received inpatient treatment related to mental health in the last 2 years?: No    PHQ 2/9 Screening   Reviewed PHQ 2/9 Depression Screening Score?: No    Means of Transportation  Means of Transport to Appts[de-identified] Family transport  In the past 12 months, has lack of transportation kept you from medical appointments or from getting medications?: No  In the past 12 months, has lack of transportation kept you from meetings, work, or from getting things needed for daily living?: No  Was application for public transport provided?: No    DISCHARGE DETAILS:    Discharge planning discussed with[de-identified] Patient  Freedom of Choice: Yes     CM contacted family/caregiver?: No- see comments (VM left for mother)  Were Treatment Team discharge recommendations reviewed with patient/caregiver?: Yes  Did patient/caregiver verbalize understanding of patient care needs?: Yes  Were patient/caregiver advised of the risks associated with not following Treatment Team discharge recommendations?: Yes    [facility]         Is the patient interested in [place] [address] at discharge?: No    DME Referral Provided  Referral made for DME?: No    Other Referral/Resources/Interventions Provided:  Interventions: Prescription Price Check  Referral Comments: CM called [facility] regarding [facility]  Per pharmacist, patient has no co-pay and confirmed Rx is ready for pick-up      Would you like to participate in our [address] service program?  : No - Declined    Treatment Team Recommendation: Home  Discharge Destination Plan[de-identified] Home  Transport at Discharge : Family  ETA of Transport (Date): 04/27/23

## 2023-04-28 ENCOUNTER — TELEPHONE (OUTPATIENT)
Dept: FAMILY MEDICINE CLINIC | Facility: CLINIC | Age: 38
End: 2023-04-28

## 2023-04-28 ENCOUNTER — TRANSITIONAL CARE MANAGEMENT (OUTPATIENT)
Dept: FAMILY MEDICINE CLINIC | Facility: CLINIC | Age: 38
End: 2023-04-28

## 2023-04-28 PROBLEM — E87.1 HYPONATREMIA: Status: RESOLVED | Noted: 2023-04-26 | Resolved: 2023-04-28

## 2023-04-28 NOTE — UTILIZATION REVIEW
NOTIFICATION OF ADMISSION DISCHARGE   This is a Notification of Discharge from [facility]  Please be advised that this patient has been discharge from our facility  Below you will find the admission and discharge date and time including the patient’s disposition  UTILIZATION REVIEW CONTACT:  Gerard Avilez MA  Utilization   Network Utilization Review Department  Phone: [phone] x carefully listen to the prompts  All voicemails are confidential   Email: [email or web address]     ADMISSION INFORMATION  PRESENTATION DATE: 4/25/2023  3:29 PM  OBERVATION ADMISSION DATE:   INPATIENT ADMISSION DATE: 4/25/23  6:13 PM   DISCHARGE DATE: 4/27/2023  3:57 PM   DISPOSITION:Home/Self Care    IMPORTANT INFORMATION:  Send all requests for admission clinical reviews, approved or denied determinations and any other requests to dedicated fax number below belonging to the campus where the patient is receiving treatment   List of dedicated fax numbers:  [address] DENIALS (Administrative/Medical Necessity) [phone]   [address] (Maternity/NICU/Pediatrics) [phone]   [facility] [phone]   [facility] [phone]   [address] [phone]   [facility] [phone]   [address] [place] [address] [phone]   [address] [place] [address] 119 [phone]   [facility]  [phone]   [facility] [phone]   [facility] [phone]

## 2023-04-29 LAB
EST. AVERAGE GLUCOSE BLD GHB EST-MCNC: 94 MG/DL
HBA1C MFR BLD: 4.9 %

## 2023-04-29 NOTE — ASSESSMENT & PLAN NOTE
(04/25-27) Elevated LDL at 216 - previously at 174 (12/07/2021); Patient is Post-Cath (see above) and will be on high intensity statin as recommended by Cardiology; continue Lipitor at discharge; follow up with PCP within 1-week from discharge date

## 2023-04-29 NOTE — ASSESSMENT & PLAN NOTE
(04/2023): Patient has long history of sleep disorder; currently requiring 10 mg Ambien per night; continue regimen at discharge; follow up with PCP

## 2023-05-07 PROBLEM — I82.411 ACUTE DEEP VEIN THROMBOSIS (DVT) OF FEMORAL VEIN OF RIGHT LOWER EXTREMITY (HCC): Status: ACTIVE | Noted: 2023-05-07

## 2023-05-07 PROBLEM — Z95.5 HISTORY OF CORONARY ARTERY STENT PLACEMENT: Status: ACTIVE | Noted: 2023-05-07

## 2023-05-07 NOTE — PROGRESS NOTES
Cardiology  MI Follow Up   Office Visit Note  Nany Winn   40 y o    male   MRN: [id number]  [address]  [address]  [address] [place] [address] He[address] W[address]  [phone]    PCP: Gian Bruno DO  Cardiologist: will be Dr Cisco Johnston                  Summary of recommendations  Heart healthy diet  Educational information provided  He declines a referral to a dietician, but will ask his mother if 1 may be beneficial, as she usually does the cooking  Fasting lipid profile 1-3 months  nonfasting CBC, BMP, UA  Check for secondary causes of dyslipidemia   Cardiac rehab has been prescribed and recommended  Follow up will be scheduled with Dr Cisco Johnston in a few weeks  Screening: Referral to GI for screening colonoscopy will be deferred given active cardiac issues      Assessment/plan  Inferior STEMI, Adm 4/25-4/27/23- [facility]: culprit Mid RCA lesion 100% stenosed; Prox Cx lesion is 80% ; 1st Mrg lesion is 70%   · PCI/PASTORA x 3 to prox RCA, OM1, LCx 4/25/23  • DCd On Brilinta  Statin, beta-blocker  DAPT deferred given anticoagulation with Eliquis  • Cardiac rehab has been prescribed and recommended  • Adherence to dual antiplatelet therapy reinforced  Right common femoral DVT on pseudoaneurysm study 4/25/2023  On Eliquis 10 mg BID x 1 week, then 5 mg BID  /80  Hyperlipidemia, severe on atorvastatin 80 mg daily   non-  Heart healthy diet  Screen for secondary causes  reports he does not believe any first degree relative has CAD  IN the past, reports he ate a lot of burgers and grilled foods      Follow-up lipid profile 1 to 3 months  TSH normal     Alk phos normal   sCr- normal  LFTs normal  UA       Micral   Latest Reference Range & Units 12/07/21 11:21 04/25/23 15:35 04/26/23 06:05   Cholesterol See Comment mg/dL 250 (H) 272 (H)    Triglycerides See Comment mg/dL 221 (H) 125 79   HDL >=40 mg/dL 32 (L) 31 (L)    Non-HDL Cholesterol mg/dl 218 241    LDL Calculated 0 - 100 mg/dL 174 (H) 216 (H)    Anxiety/depression  Intellectual disability:overall very independent, helps care for his dad at home  He does not drive  Cardiac testing  • TTE 4/26/23 EF   No RWMA  Diastolic function normal   RV normal   • Cardiac catheterization 4/25/23  •  Mid RCA lesion is 100% stenosed  •  Prox Cx lesion is 80% stenosed  •  1st Mrg lesion is 70% stenosed  •  The angioplasty was pre-stent angioplasty  Two-vessel coronary artery disease, with a total occlusion of the mid right coronary artery representing the culprit for the patient's inferior STEMI, and an 80% stenosis of the proximal circumflex artery  Successful IVUS-guided PCI, mid RCA, with reduction in stenosis from 100% to 0% following PCI and placement of a 3 5 24mm drug-eluting stent  Successful IVUS-guided PCI, proximal circumflex artery, with reduction in stenosis from 80% to 0% following placement of a 2 5x15mm PASTORA  Successful PCI, OM1, with reduction stenosis from 70% to 0% following placement of a 2 [id number] PASTORA  Radial access was employed for angiography and RCA PCI  Ascending aortic anatomy made engagement of the left main artery for circumflex PCI difficult, and access was switched to femoral for the circumflex and OM PCI's  Plan: DAPT, statin, beta-blockade, echocardiography, cardiac rehabilitation  The patient's LDL has been markedly elevated in the past and was greater than 200 on the day of admission  This represents his major risk factor for coronary disease                    HPI  Socorro Hwang is a 39 yo male with an intellectual disability, anxiety/depression; no known history of CAD heart failure nor arrhythmia  He does have a history of dyslipidemia, untreated  Adm 4/25-4/27/23  [facility]  CC: Sudden onset chest pain, diaphoresis, vomiting, SOB   Brought in by EMS as a pre -hosp MI alert  EKG: ST, Inferior ST elevation  Urgent left heart catheterization:   Two-vessel CAD  :Culprit 100% mid RCA, successfully treated with a PASTORA  He also had an 80% proximal circumflex and a 70% OM1, successfully treated with 2 PASTORA  Placed on ticagrelor beta-blocker  Initially started on DAPT with aspirin, later transitioned to Eliquis given acute DVT  Started on  a high intensity statin with atorvastatin 80 mg daily for a baseline LDL of 216, beta blocker  Echo: LVEF normal, trace MR, trace TR  Referral for cardiac rehab placed  He later complained of sharp groin pain for which a pseudoaneurysm study was ordered  This demonstrated a focal, non-occlusive acute vs subacute thrombus noted in the right common femoral vein  He was started on Eliquis prior to discharge therefore recommended  Eliquis and Brilinta, aspirin was discontinued  DC'd on Eliquis 10 mg twice daily x7 days then 5 mg twice daily     5/8/23  Hospital follow-up, MI /multivessel disease, acute DVT LE  He had a neighbor bring him today  He is adherent to his medical therapy; I reviewed this  ROS: he denies chest pain, shortness of breath, lightheadedness or dizziness  He does have some bruising, at the right radial and femoral access sites  Blood pressure 132/80, heart rate 85 and regular by exam  Exam unremarkable  Together reviewed his coronary angiogram diagram and his echocardiogram   He is aware of the findings  I reviewed his baseline lipids with him  He tells me his diet has been extremely poor, apparently high in fat and cholesterol  I specifically reviewed his family history he denies any first-degree relative with CAD  We will screen for secondary causes  I refilled his medications  I also gave him a pill organizer to assist with medication administration  He is independent in taking his own meds on his own  He declined referral to dietitian  He does have a booklet about a heart healthy diet    I gave him some additional information  He will follow with a cardiologist in short interval   We will get nonfasting labs today, lipids in 1 month        I have spent 40 minutes with Patient  today in which greater than 50% of this time was spent in counseling/coordination of care regarding Diagnostic results, Instructions for management, Patient and family education, Importance of tx compliance, Risk factor reductions, Counseling / Coordination of care, Documenting in the medical record and Reviewing / ordering tests, medicine, procedures    Assessment  Diagnoses and all orders for this visit:    Hospital discharge follow-up    STEMI (ST elevation myocardial infarction) ([facility] [address] )  -     metoprolol succinate (TOPROL-XL) 25 mg 24 hr tablet; Take 1 tablet (25 mg total) by mouth daily  -     atorvastatin (LIPITOR) 80 mg tablet; Take 1 tablet (80 mg total) by mouth every evening    History of coronary artery stent placement  -     CBC and Platelet; Future  -     Basic metabolic panel; Future    Mixed hyperlipidemia  -     Lipid Panel With Direct LDL; Future  -     UA (URINE) with reflex to Scope    Intellectual disability    Acute deep vein thrombosis (DVT) of femoral vein of right lower extremity (HCC)  -     CBC and Platelet; Future          Past Medical History:   Diagnosis Date   • Allergic    • Anxiety    • Depression    • Diverticulitis    • Diverticulitis of colon    • GERD (gastroesophageal reflux disease)    • Insomnia        Review of Systems   Constitutional: Negative for chills  Cardiovascular: Negative for chest pain, claudication, cyanosis, dyspnea on exertion, irregular heartbeat, leg swelling, near-syncope, orthopnea, palpitations, paroxysmal nocturnal dyspnea and syncope  Respiratory: Negative for cough and shortness of breath  Gastrointestinal: Negative for heartburn and nausea  Neurological: Negative for dizziness, focal weakness, headaches, light-headedness and weakness  All other systems reviewed and are negative  No Known Allergies        Current Outpatient Medications:   •  apixaban (Eliquis) 5 mg, Take 2 tablets (10 mg total) by mouth 2 (two) times a day for 7 days, THEN 1 tablet (5 mg total) 2 (two) times a day for 23 days  , Disp: 74 tablet, Rfl: 0  •  atorvastatin (LIPITOR) 80 mg tablet, Take 1 tablet (80 mg total) by mouth every evening, Disp: 90 tablet, Rfl: 3  •  citalopram (CeleXA) 20 mg tablet, take 1 tablet by mouth once daily, Disp: 30 tablet, Rfl: 0  •  metoprolol succinate (TOPROL-XL) 25 mg 24 hr tablet, Take 1 tablet (25 mg total) by mouth daily, Disp: 90 tablet, Rfl: 3  •  nitroglycerin (NITROSTAT) 0 4 mg SL tablet, Place 1 tablet (0 4 mg total) under the tongue every 5 (five) minutes as needed for chest pain for up to 30 doses, Disp: 30 tablet, Rfl: 0  •  omeprazole (PriLOSEC) 20 mg delayed release capsule, Take 1 capsule (20 mg total) by mouth daily before breakfast, Disp: 90 capsule, Rfl: 3  •  ticagrelor (BRILINTA) 90 MG, Take 1 tablet (90 mg total) by mouth every 12 (twelve) hours, Disp: 60 tablet, Rfl: 11  •  zolpidem (AMBIEN) 10 mg tablet, Take 1 tablet (10 mg total) by mouth daily at bedtime as needed for sleep, Disp: 30 tablet, Rfl: 0        Social History     Socioeconomic History   • Marital status: Single     Spouse name: Not on file   • Number of children: Not on file   • Years of education: Not on file   • Highest education level: Not on file   Occupational History   • Not on file   Tobacco Use   • Smoking status: Never   • Smokeless tobacco: Never   Vaping Use   • Vaping Use: Never used   Substance and Sexual Activity   • Alcohol use: Yes     Comment: occasionally   • Drug use: Never   • Sexual activity: Yes     Birth control/protection: Condom Male   Other Topics Concern   • Not on file   Social History Narrative   • Not on file     Social Determinants of Health     Financial Resource Strain: Low Risk    • Difficulty of Paying Living Expenses: Not hard at all   Food Insecurity: No Food Insecurity   • Worried About Running Out of Food in the Last Year: Never true   • Ran Out of Food in the Last Year: Never true "  Transportation Needs: No Transportation Needs   • Lack of Transportation (Medical): No   • Lack of Transportation (Non-Medical): No   Physical Activity: Not on file   Stress: Not on file   Social Connections: Not on file   Intimate Partner Violence: Not on file   Housing Stability: Low Risk    • Unable to Pay for Housing in the Last Year: No   • Number of Places Lived in the Last Year: 1   • Unstable Housing in the Last Year: No       Family History   Problem Relation Age of Onset   • Depression Mother    • Anxiety disorder Mother    • Depression Father    • Anxiety disorder Father    • Prostate cancer Father    • Cancer Father    • Cancer Paternal Grandfather        Physical Exam  Vitals and nursing note reviewed  Constitutional:       General: He is not in acute distress  HENT:      Head: Normocephalic and atraumatic  Eyes:      Conjunctiva/sclera: Conjunctivae normal    Cardiovascular:      Rate and Rhythm: Normal rate and regular rhythm  Pulses: Intact distal pulses  Heart sounds: Normal heart sounds  Pulmonary:      Effort: Pulmonary effort is normal       Breath sounds: Normal breath sounds  Abdominal:      General: Bowel sounds are normal       Palpations: Abdomen is soft  Musculoskeletal:         General: Normal range of motion  Cervical back: Normal range of motion and neck supple  Skin:     General: Skin is warm and dry  Neurological:      Mental Status: He is alert and oriented to person, place, and time  Vitals: Blood pressure 132/80, pulse 85, height 5' 9\" (1 753 m), weight 77 3 kg (170 lb 6 4 oz), SpO2 99 %     Wt Readings from Last 3 Encounters:   05/08/23 77 3 kg (170 lb 6 4 oz)   04/26/23 77 7 kg (171 lb 4 8 oz)   03/28/23 82 4 kg (181 lb 9 6 oz)         Labs & Results:  Lab Results   Component Value Date    WBC 9 71 04/27/2023    HGB 12 4 04/27/2023    HCT 38 2 04/27/2023    MCV 87 04/27/2023     04/27/2023     No results found for: BNP  No components " found for: CHEM  No results found for: [place], TROPONINT, CKMBINDEX  No results found for this or any previous visit  No results found for this or any previous visit  This note was completed in part utilizing m-modal fluency direct voice recognition software  Grammatical errors, random word insertion, spelling mistakes, and incomplete sentences may be an occasional consequence of the system secondary to software limitations, ambient noise and hardware issues  At the time of dictation, efforts were made to edit, clarify and /or correct errors  Please read the chart carefully and recognize, using context, where substitutions have occurred    If you have any questions or concerns about the context, text or information contained within the body of this dictation, please contact myself, the provider, for further clarification

## 2023-05-08 ENCOUNTER — OFFICE VISIT (OUTPATIENT)
Dept: CARDIOLOGY CLINIC | Facility: CLINIC | Age: 38
End: 2023-05-08

## 2023-05-08 ENCOUNTER — APPOINTMENT (OUTPATIENT)
Dept: LAB | Facility: CLINIC | Age: 38
End: 2023-05-08

## 2023-05-08 VITALS
HEIGHT: 69 IN | WEIGHT: 170.4 LBS | SYSTOLIC BLOOD PRESSURE: 132 MMHG | BODY MASS INDEX: 25.24 KG/M2 | HEART RATE: 85 BPM | OXYGEN SATURATION: 99 % | DIASTOLIC BLOOD PRESSURE: 80 MMHG

## 2023-05-08 DIAGNOSIS — Z95.5 HISTORY OF CORONARY ARTERY STENT PLACEMENT: ICD-10-CM

## 2023-05-08 DIAGNOSIS — F79 INTELLECTUAL DISABILITY: Chronic | ICD-10-CM

## 2023-05-08 DIAGNOSIS — I82.411 ACUTE DEEP VEIN THROMBOSIS (DVT) OF FEMORAL VEIN OF RIGHT LOWER EXTREMITY (HCC): ICD-10-CM

## 2023-05-08 DIAGNOSIS — E78.2 MIXED HYPERLIPIDEMIA: ICD-10-CM

## 2023-05-08 DIAGNOSIS — I21.3 STEMI (ST ELEVATION MYOCARDIAL INFARCTION) (HCC): ICD-10-CM

## 2023-05-08 DIAGNOSIS — Z09 HOSPITAL DISCHARGE FOLLOW-UP: Primary | ICD-10-CM

## 2023-05-08 DIAGNOSIS — G47.9 SLEEP DISTURBANCE: ICD-10-CM

## 2023-05-08 LAB
ANION GAP SERPL CALCULATED.3IONS-SCNC: 5 MMOL/L (ref 4–13)
BACTERIA UR QL AUTO: ABNORMAL /HPF
BILIRUB UR QL STRIP: NEGATIVE
BUN SERPL-MCNC: 12 MG/DL (ref 5–25)
CALCIUM SERPL-MCNC: 9.9 MG/DL (ref 8.4–10.2)
CHLORIDE SERPL-SCNC: 102 MMOL/L (ref 96–108)
CLARITY UR: CLEAR
CO2 SERPL-SCNC: 29 MMOL/L (ref 21–32)
COLOR UR: YELLOW
CREAT SERPL-MCNC: 0.92 MG/DL (ref 0.6–1.3)
ERYTHROCYTE [DISTWIDTH] IN BLOOD BY AUTOMATED COUNT: 13.3 % (ref 11.6–15.1)
GFR SERPL CREATININE-BSD FRML MDRD: 105 ML/MIN/1.73SQ M
GLUCOSE P FAST SERPL-MCNC: 92 MG/DL (ref 65–99)
GLUCOSE UR STRIP-MCNC: NEGATIVE MG/DL
HCT VFR BLD AUTO: 38.6 % (ref 36.5–49.3)
HGB BLD-MCNC: 12.4 G/DL (ref 12–17)
HGB UR QL STRIP.AUTO: NEGATIVE
KETONES UR STRIP-MCNC: NEGATIVE MG/DL
LEUKOCYTE ESTERASE UR QL STRIP: NEGATIVE
MCH RBC QN AUTO: 28.3 PG (ref 26.8–34.3)
MCHC RBC AUTO-ENTMCNC: 32.1 G/DL (ref 31.4–37.4)
MCV RBC AUTO: 88 FL (ref 82–98)
MUCOUS THREADS UR QL AUTO: ABNORMAL
NITRITE UR QL STRIP: NEGATIVE
NON-SQ EPI CELLS URNS QL MICRO: ABNORMAL /HPF
PH UR STRIP.AUTO: 5.5 [PH]
PLATELET # BLD AUTO: 374 THOUSANDS/UL (ref 149–390)
PMV BLD AUTO: 9.2 FL (ref 8.9–12.7)
POTASSIUM SERPL-SCNC: 4.3 MMOL/L (ref 3.5–5.3)
PROT UR STRIP-MCNC: ABNORMAL MG/DL
RBC # BLD AUTO: 4.38 MILLION/UL (ref 3.88–5.62)
RBC #/AREA URNS AUTO: ABNORMAL /HPF
SODIUM SERPL-SCNC: 136 MMOL/L (ref 135–147)
SP GR UR STRIP.AUTO: 1.03 (ref 1–1.03)
UROBILINOGEN UR STRIP-ACNC: 2 MG/DL
WBC # BLD AUTO: 8.08 THOUSAND/UL (ref 4.31–10.16)
WBC #/AREA URNS AUTO: ABNORMAL /HPF

## 2023-05-08 RX ORDER — METOPROLOL SUCCINATE 25 MG/1
25 TABLET, EXTENDED RELEASE ORAL DAILY
Qty: 90 TABLET | Refills: 3 | Status: SHIPPED | OUTPATIENT
Start: 2023-05-08 | End: 2024-05-02

## 2023-05-08 RX ORDER — ATORVASTATIN CALCIUM 80 MG/1
80 TABLET, FILM COATED ORAL EVERY EVENING
Qty: 90 TABLET | Refills: 3 | Status: SHIPPED | OUTPATIENT
Start: 2023-05-08 | End: 2024-05-02

## 2023-05-08 NOTE — LETTER
May 8, 2023     [address]    Patient: Jennifer Desouza   YOB: 1985   Date of Visit: 5/8/2023       Dear Dr Dylon Terry:    Thank you for referring Jennifer Desouza to me for evaluation  Below are my notes for this consultation  If you have questions, please do not hesitate to call me  I look forward to following your patient along with you  Sincerely,        LUX Felton        CC: DO Steven Hope, Lavell Guzmán   5/8/2023 10:02 AM  Sign when Signing Visit  Cardiology  MI Follow Up   Office Visit Note  Jennifer Desouza   40 y o    male   MRN: [id number]  [address] [place] [address] [address] [place] [address] [place] [place] [address]  [phone]    PCP: Dony Stark DO  Cardiologist: will be Dr Jasbir Valle                  Summary of recommendations  Heart healthy diet  Educational information provided  He declines a referral to a dietician, but will ask his mother if 1 may be beneficial, as she usually does the cooking  Fasting lipid profile 1-3 months  nonfasting CBC, BMP, UA  Check for secondary causes of dyslipidemia   Cardiac rehab has been prescribed and recommended  Follow up will be scheduled with Dr Jasbir Valle in a few weeks  Screening: Referral to GI for screening colonoscopy will be deferred given active cardiac issues      Assessment/plan  Inferior STEMI, Adm 4/25-4/27/23- [facility]: culprit Mid RCA lesion 100% stenosed; Prox Cx lesion is 80% ; 1st Mrg lesion is 70%   · PCI/PASTORA x 3 to prox RCA, OM1, LCx 4/25/23  • DCd On Brilinta  Statin, beta-blocker  DAPT deferred given anticoagulation with Eliquis  • Cardiac rehab has been prescribed and recommended  • Adherence to dual antiplatelet therapy reinforced  Right common femoral DVT on pseudoaneurysm study 4/25/2023  On Eliquis 10 mg BID x 1 week, then 5 mg BID  /80  Hyperlipidemia, severe on atorvastatin 80 mg daily   non-    Heart healthy diet  Screen for secondary causes  reports he does not believe any first degree relative has CAD  IN the past, reports he ate a lot of burgers and grilled foods  Follow-up lipid profile 1 to 3 months  TSH normal     Alk phos normal   sCr- normal  LFTs normal  UA       Micral   Latest Reference Range & Units 12/07/21 11:21 04/25/23 15:35 04/26/23 06:05   Cholesterol See Comment mg/dL 250 (H) 272 (H)    Triglycerides See Comment mg/dL 221 (H) 125 79   HDL >=40 mg/dL 32 (L) 31 (L)    Non-HDL Cholesterol mg/dl 218 241    LDL Calculated 0 - 100 mg/dL 174 (H) 216 (H)    Anxiety/depression  Intellectual disability:overall very independent, helps care for his dad at home  He does not drive  Cardiac testing  • TTE 4/26/23 EF   No RWMA  Diastolic function normal   RV normal   • Cardiac catheterization 4/25/23  •  Mid RCA lesion is 100% stenosed  •  Prox Cx lesion is 80% stenosed  •  1st Mrg lesion is 70% stenosed  •  The angioplasty was pre-stent angioplasty  Two-vessel coronary artery disease, with a total occlusion of the mid right coronary artery representing the culprit for the patient's inferior STEMI, and an 80% stenosis of the proximal circumflex artery  Successful IVUS-guided PCI, mid RCA, with reduction in stenosis from 100% to 0% following PCI and placement of a 3 5 24mm drug-eluting stent  Successful IVUS-guided PCI, proximal circumflex artery, with reduction in stenosis from 80% to 0% following placement of a 2 5x15mm PASTORA  Successful PCI, OM1, with reduction stenosis from 70% to 0% following placement of a 2 [id number] PASTORA  Radial access was employed for angiography and RCA PCI  Ascending aortic anatomy made engagement of the left main artery for circumflex PCI difficult, and access was switched to femoral for the circumflex and OM PCI's  Plan: DAPT, statin, beta-blockade, echocardiography, cardiac rehabilitation    The patient's LDL has been markedly elevated in the past and was greater than 200 on the day of admission  This represents his major risk factor for coronary disease                    HPI  Jeremías Silva is a 41 yo male with an intellectual disability, anxiety/depression; no known history of CAD heart failure nor arrhythmia  He does have a history of dyslipidemia, untreated  Adm 4/25-4/27/23  [facility]  CC: Sudden onset chest pain, diaphoresis, vomiting, SOB  Brought in by EMS as a pre -hosp MI alert  EKG: ST, Inferior ST elevation  Urgent left heart catheterization:   Two-vessel CAD  :Culprit 100% mid RCA, successfully treated with a PASTORA  He also had an 80% proximal circumflex and a 70% OM1, successfully treated with 2 PASTORA  Placed on ticagrelor beta-blocker  Initially started on DAPT with aspirin, later transitioned to Eliquis given acute DVT  Started on  a high intensity statin with atorvastatin 80 mg daily for a baseline LDL of 216, beta blocker  Echo: LVEF normal, trace MR, trace TR  Referral for cardiac rehab placed  He later complained of sharp groin pain for which a pseudoaneurysm study was ordered  This demonstrated a focal, non-occlusive acute vs subacute thrombus noted in the right common femoral vein  He was started on Eliquis prior to discharge therefore recommended  Eliquis and Brilinta, aspirin was discontinued  DC'd on Eliquis 10 mg twice daily x7 days then 5 mg twice daily     5/8/23  Hospital follow-up, MI /multivessel disease, acute DVT LE  He had a neighbor bring him today  He is adherent to his medical therapy; I reviewed this  ROS: he denies chest pain, shortness of breath, lightheadedness or dizziness  He does have some bruising, at the right radial and femoral access sites  Blood pressure 132/80, heart rate 85 and regular by exam  Exam unremarkable  Together reviewed his coronary angiogram diagram and his echocardiogram   He is aware of the findings  I reviewed his baseline lipids with him    He tells me his diet has been extremely poor, apparently high in fat and cholesterol  I specifically reviewed his family history he denies any first-degree relative with CAD  We will screen for secondary causes  I refilled his medications  I also gave him a pill organizer to assist with medication administration  He is independent in taking his own meds on his own  He declined referral to dietitian  He does have a booklet about a heart healthy diet  I gave him some additional information  He will follow with a cardiologist in short interval   We will get nonfasting labs today, lipids in 1 month        I have spent 40 minutes with Patient  today in which greater than 50% of this time was spent in counseling/coordination of care regarding Diagnostic results, Instructions for management, Patient and family education, Importance of tx compliance, Risk factor reductions, Counseling / Coordination of care, Documenting in the medical record and Reviewing / ordering tests, medicine, procedures    Assessment  Diagnoses and all orders for this visit:    Hospital discharge follow-up    [facility]  -     metoprolol succinate (TOPROL-XL) 25 mg 24 hr tablet; Take 1 tablet (25 mg total) by mouth daily  -     atorvastatin (LIPITOR) 80 mg tablet; Take 1 tablet (80 mg total) by mouth every evening    History of coronary artery stent placement  -     CBC and Platelet; Future  -     Basic metabolic panel; Future    Mixed hyperlipidemia  -     Lipid Panel With Direct LDL; Future  -     UA (URINE) with reflex to Scope    Intellectual disability    Acute deep vein thrombosis (DVT) of femoral vein of right lower extremity (HCC)  -     CBC and Platelet; Future          Past Medical History:   Diagnosis Date   • Allergic    • Anxiety    • Depression    • Diverticulitis    • Diverticulitis of colon    • GERD (gastroesophageal reflux disease)    • Insomnia        Review of Systems   Constitutional: Negative for chills     Cardiovascular: Negative for chest pain, claudication, cyanosis, dyspnea on exertion, irregular heartbeat, leg swelling, near-syncope, orthopnea, palpitations, paroxysmal nocturnal dyspnea and syncope  Respiratory: Negative for cough and shortness of breath  Gastrointestinal: Negative for heartburn and nausea  Neurological: Negative for dizziness, focal weakness, headaches, light-headedness and weakness  All other systems reviewed and are negative  No Known Allergies    Current Outpatient Medications:   •  apixaban (Eliquis) 5 mg, Take 2 tablets (10 mg total) by mouth 2 (two) times a day for 7 days, THEN 1 tablet (5 mg total) 2 (two) times a day for 23 days  , Disp: 74 tablet, Rfl: 0  •  atorvastatin (LIPITOR) 80 mg tablet, Take 1 tablet (80 mg total) by mouth every evening, Disp: 90 tablet, Rfl: 3  •  citalopram (CeleXA) 20 mg tablet, take 1 tablet by mouth once daily, Disp: 30 tablet, Rfl: 0  •  metoprolol succinate (TOPROL-XL) 25 mg 24 hr tablet, Take 1 tablet (25 mg total) by mouth daily, Disp: 90 tablet, Rfl: 3  •  nitroglycerin (NITROSTAT) 0 4 mg SL tablet, Place 1 tablet (0 4 mg total) under the tongue every 5 (five) minutes as needed for chest pain for up to 30 doses, Disp: 30 tablet, Rfl: 0  •  omeprazole (PriLOSEC) 20 mg delayed release capsule, Take 1 capsule (20 mg total) by mouth daily before breakfast, Disp: 90 capsule, Rfl: 3  •  ticagrelor (BRILINTA) 90 MG, Take 1 tablet (90 mg total) by mouth every 12 (twelve) hours, Disp: 60 tablet, Rfl: 11  •  zolpidem (AMBIEN) 10 mg tablet, Take 1 tablet (10 mg total) by mouth daily at bedtime as needed for sleep, Disp: 30 tablet, Rfl: 0        Social History     Socioeconomic History   • Marital status: Single     Spouse name: Not on file   • Number of children: Not on file   • Years of education: Not on file   • Highest education level: Not on file   Occupational History   • Not on file   Tobacco Use   • Smoking status: Never   • Smokeless tobacco: Never   Vaping Use   • Vaping Use: Never used   Substance and Sexual Activity   • Alcohol use: Yes     Comment: occasionally   • Drug use: Never   • Sexual activity: Yes     Birth control/protection: Condom Male   Other Topics Concern   • Not on file   Social History Narrative   • Not on file     Social Determinants of Health     Financial Resource Strain: Low Risk    • Difficulty of Paying Living Expenses: Not hard at all   Food Insecurity: No Food Insecurity   • Worried About [address] in the Last Year: Never true   • Ran Out of Food in the Last Year: Never true   Transportation Needs: No Transportation Needs   • Lack of Transportation (Medical): No   • Lack of Transportation (Non-Medical): No   Physical Activity: Not on file   Stress: Not on file   Social Connections: Not on file   Intimate Partner Violence: Not on file   Housing Stability: Low Risk    • Unable to Pay for Housing in the Last Year: No   • Number of Places Lived in the Last Year: 1   • Unstable Housing in the Last Year: No       Family History   Problem Relation Age of Onset   • Depression Mother    • Anxiety disorder Mother    • Depression Father    • Anxiety disorder Father    • Prostate cancer Father    • Cancer Father    • Cancer Paternal Grandfather        Physical Exam  Vitals and nursing note reviewed  Constitutional:       General: He is not in acute distress  HENT:      Head: Normocephalic and atraumatic  Eyes:      Conjunctiva/sclera: Conjunctivae normal    Cardiovascular:      Rate and Rhythm: Normal rate and regular rhythm  Pulses: Intact distal pulses  Heart sounds: Normal heart sounds  Pulmonary:      Effort: Pulmonary effort is normal       Breath sounds: Normal breath sounds  Abdominal:      General: Bowel sounds are normal       Palpations: Abdomen is soft  Musculoskeletal:         General: Normal range of motion  Cervical back: Normal range of motion and neck supple  Skin:     General: Skin is warm and dry     Neurological: "     Mental Status: He is alert and oriented to person, place, and time  Vitals: Blood pressure 132/80, pulse 85, height 5' 9\" (1 753 m), weight 77 3 kg (170 lb 6 4 oz), SpO2 99 %  Wt Readings from Last 3 Encounters:   05/08/23 77 3 kg (170 lb 6 4 oz)   04/26/23 77 7 kg (171 lb 4 8 oz)   03/28/23 82 4 kg (181 lb 9 6 oz)         Labs & Results:  Lab Results   Component Value Date    WBC 9 71 04/27/2023    HGB 12 4 04/27/2023    HCT 38 2 04/27/2023    MCV 87 04/27/2023     04/27/2023     No results found for: BNP  No components found for: CHEM  No results found for: CKTOTAL, TROPONINI, TROPONINT, CKMBINDEX  No results found for this or any previous visit  No results found for this or any previous visit  This note was completed in part utilizing [employer] fluency direct voice recognition software  Grammatical errors, random word insertion, spelling mistakes, and incomplete sentences may be an occasional consequence of the system secondary to software limitations, ambient noise and hardware issues  At the time of dictation, efforts were made to edit, clarify and /or correct errors  Please read the chart carefully and recognize, using context, where substitutions have occurred    If you have any questions or concerns about the context, text or information contained within the body of this dictation, please contact myself, the provider, for further clarification        "

## 2023-05-08 NOTE — PATIENT INSTRUCTIONS
DASH Eating Plan   WHAT YOU NEED TO KNOW:   The DASH (Dietary Approaches to Stop Hypertension) Eating Plan is designed to help prevent or lower high blood pressure  It can also help to lower LDL (bad) cholesterol and decrease your risk for heart disease  The plan is low in sodium, sugar, unhealthy fats, and total fat  It is high in potassium, calcium, magnesium, and fiber  These nutrients are added when you eat more fruits, vegetables, and whole grains  With the DASH eating plan, you need to eat a certain number of servings from each food group  This will help you get enough of certain nutrients and limit others  The amount of servings you should eat depends on how many calories you need  Your dietitian can help you create meal plans with the right number of servings for each food group  DISCHARGE INSTRUCTIONS:   What you need to know about sodium:  Your dietitian will tell you how much sodium is safe for you to have each day  People with high blood pressure should have no more than 1,500 to 2,300 mg of sodium in a day  A teaspoon (tsp) of salt has 2,300 mg of sodium  This may seem like a difficult goal, but small changes to the foods you eat can make a big difference  Your healthcare provider or dietitian can help you create a meal plan that follows your sodium limit  Read food labels  Food labels can help you choose foods that are low in sodium  The amount of sodium is listed in milligrams (mg)  The % Daily Value (DV) column tells you how much of your daily needs are met by 1 serving of the food for each nutrient listed  Choose foods that have less than 5% of the DV of sodium  These foods are considered low in sodium  Foods that have 20% or more of the DV of sodium are considered high in sodium  Avoid foods that have more than 300 mg of sodium in each serving  Choose foods that say low-sodium, reduced-sodium, or no salt added on the food label  Limit added salt    Do not salt food at the table if you add salt when you cook  Use herbs and spices, such as onions, garlic, and salt-free seasonings to add flavor  Try lemon or lime juice or vinegar to add a tart flavor  Use hot peppers or a small amount of hot pepper sauce to add a spicy flavor  Limit foods high in added salt, such as the following:    Seasonings made with salt, such as garlic salt, celery salt, onion salt, seasoned salt, meat tenderizers, and monosodium glutamate (MSG)    Miso soup and canned or dried soup mixes    Regular soy sauce, barbecue sauce, teriyaki sauce, steak sauce, Worcestershire sauce, and most flavored vinegars    Snack foods, such as salted chips, popcorn, pretzels, pork rinds, salted crackers, and salted nuts    Frozen foods, such as dinners, entrees, vegetables with sauces, and breaded meats    Ask about salt substitutes  Ask your healthcare provider if you may use salt substitutes  Some salt substitutes have ingredients that can be harmful if you have certain health conditions  Choose foods carefully at restaurants  Meals from restaurants, especially fast food restaurants, are often high in sodium  Some restaurants have nutrition information that tells you the amount of sodium in their foods  Ask to have your food prepared with less, or no salt  What you need to know about fats:  Healthy fats include unsaturated fats and omega-3 fatty acids  Unhealthy fats include saturated fats and trans fats    Include healthy fats, such as the following:      Cooking oils, such as soybean, canola, olive, or sunflower    Fatty fish, such as salmon, tuna, mackerel, or sardines    Flaxseed oil or ground flaxseed    ½ cup of cooked beans, such as black beans, kidney beans, or mendoza beans    1½ ounces of low-sodium nuts, such as almonds or walnuts    Low-sugar, low-sodium peanut butter    Seeds such as nicole seeds or sunflower seeds       Limit or do not have unhealthy fats, such as the following:      Foods that contain fat from animals, such as fatty meats, whole milk, butter, and cream    Shortening, stick margarine, palm oil, and coconut oil    Full-fat or creamy salad dressing    Creamy soup    Crackers, chips, and baked goods made with margarine or shortening    Foods that are fried in unhealthy fats    Gravy and sauces, such as Negro or cheese sauces    What you need to know about carbohydrates (carbs): All carbs break down into sugar  Complex carbs contain more fiber than simple carbs  This means complex carbs go into the bloodstream more slowly and cause less of a blood sugar spike  Try to include more complex carbs and fewer simple carbs  Include complex carbs, such as the followin slice of whole-grain bread    1 ounce of dry cereal that does not contain added sugar    ½ cup of cooked oatmeal    2 ounces of cooked whole-grain pasta    ½ cup of cooked brown rice    Limit or do not have simple carbs, such as the following:      [employer], such as doughnuts, pastries, and cookies    Mixes for cornbread and biscuits    White rice and pasta mixes, such as boxed macaroni and cheese    Instant and cold cereals that contain sugar    Jelly, jam, and ice cream that contain sugar    Condiments such as ketchup    Drinks high in sugar, such as soft drinks, lemonade, and fruit juice    What you need to know about vegetables and fruits:  Vegetables and fruits can be fresh, frozen, or canned  If possible, try to choose low-sodium canned options    Include a variety of vegetables and fruits, such as the followin medium apple, pear, or peach (about ½ cup chopped)    ½ small banana    ½ cup berries, such as blueberries, strawberries, or blackberries    1 cup of raw leafy greens, such as lettuce, spinach, kale, or marco antonio greens    ½ cup of frozen or canned (no added salt) vegetables, such as green beans    ½ cup of fresh, frozen, or canned fruit (canned in light syrup or fruit juice)    ½ cup of vegetable or fruit juice    Limit or do not have vegetables and fruits made in the following ways:      Frozen fruit such as cherries that have added sugar    Fruit in cream or butter sauce    Canned vegetables that are high in sodium    Sauerkraut, pickled vegetables, and other foods prepared in brine    Fried vegetables or vegetables in butter or high-fat sauces    What you need to know about protein foods: Include lean or low-fat protein foods, such as the following:      Poultry (chicken, turkey) with no skin    Fish (especially fatty fish, such as salmon, fresh tuna, or mackerel)    Lean beef and pork (loin, round, extra lean hamburger)    Egg whites and egg substitutes    1 cup of nonfat (skim) or 1% milk    1½ ounces of fat-free or low-fat cheese    6 ounces of nonfat or low-fat yogurt    Limit or do not have high-fat protein foods, such as the following:      Smoked or cured meat, such as corned beef, robles, ham, hot dogs, and sausage    Canned beans and canned meats or spreads, such as potted meats, sardines, anchovies, and imitation seafood    Deli or lunch meats, such as bologna, ham, turkey, and roast beef    High-fat meat (T-bone steak, regular hamburger, and ribs)    Whole eggs and egg yolks    Whole milk, 2% milk, and cream    Regular cheese and processed cheese    Other guidelines to follow:   Maintain a healthy weight  Your risk for heart disease is higher if you are overweight  Your healthcare provider may suggest that you lose weight if you are overweight  You can lose weight by eating fewer calories and foods that have added sugars and fat  The DASH meal plan can help you do this  Decrease calories by eating smaller portions at each meal and fewer snacks  Ask your healthcare provider for more information about how to lose weight  Exercise regularly  Regular exercise can help you reach or maintain a healthy weight  Regular exercise can also help decrease your blood pressure and improve your cholesterol levels   Get 30 minutes or more of moderate exercise each day of the week  To lose weight, get at least 60 minutes of exercise  Talk to your healthcare provider about the best exercise program for you  Limit alcohol  Women should limit alcohol to 1 drink a day  Men should limit alcohol to 2 drinks a day  A drink of alcohol is 12 ounces of beer, 5 ounces of wine, or 1½ ounces of liquor  For more information:   [employer] [facility] and [address]  [address]  Lizette [place] [address]  Phone: [phone]  Web Address: [facility] no    © Copyright [employer] 2022 Information is for End User's use only and may not be sold, redistributed or otherwise used for commercial purposes  The above information is an  only  It is not intended as medical advice for individual conditions or treatments  Talk to your doctor, nurse or pharmacist before following any medical regimen to see if it is safe and effective for you  Mediterranean Diet   AMBULATORY CARE:   A Mediterranean diet  is a meal plan that includes foods that are commonly eaten in countries that border the [facility]  This meal plan may provide several health benefits  These include losing or maintaining weight, and decreasing blood pressure, blood sugar, and cholesterol levels  It may also help protect against certain health conditions such as heart disease, cancer, type 2 diabetes, and Alzheimer disease  Work with a dietitian to develop a meal plan that is right for you  Foods to include in the [facility] diet:   Include fruits and vegetables in each meal   Eat a variety of fresh fruits and vegetables  Choose whole grains every day  These foods include whole-grain breads, pastas, and cereals  It also includes brown rice, quinoa, and millet  Use unsaturated fats instead of saturated fats  Cook with olive or canola oil   Limit saturated fats, such as butter, margarine, and shortening  Saturated fat is an unhealthy fat that can increase your cholesterol levels  Choose plant foods, poultry, and fish as your main sources of protein  Eat plant-based foods that provide protein,  such as lentils, beans, chickpeas, nuts, and seeds  Choose mostly plant-based foods in place of meat on most days of the week  Eat protein foods high in omega-3 fats  Fish high in omega-3 fats include salmon, trout, and tuna  Include these types of fish 1 or 2 times each week  Limit fish high in mercury, such as shark, swordfish, tilefish, and divine mackerel  Omega-3 fats are also found in walnuts and flaxseed  Choose poultry (chicken or turkey)  without skin instead of red meat  Red meat is high in saturated fat  Limit eggs and high-fat meats, such as robles, sausage, and hot dogs  Choose low-fat dairy foods  such as nonfat or 1% milk, or low-fat almond, cashew, or soy milk  Other examples include low-fat cheese, yogurt, and cottage cheese  Limit sweets  Limit your intake of high-sugar foods, such as soda, desserts, and candy  Talk to your healthcare provider about alcohol  Studies have shown that moderate intake of wine may reduce the risk of heart disease  A moderate amount of wine is 1 serving for women and men 65 years and older each day  Two servings is recommended for men 24to 59years of age each day  A serving of wine is 5 ounces  Other things you need to know if you follow the Mediterranean diet:   Include foods high in iron and vitamin C   Plant-based foods that are high in iron include spinach, beans, tofu, and artichoke  Eat a serving of vitamin C with any iron-rich food to help your body absorb more iron  Examples include oranges, strawberries, cantaloupe, broccoli, and yellow peppers  Get regular physical activity  The Mediterranean diet will have the most benefit if you get regular physical activity   Get 30 minutes of physical activity at least 5 days a week  Choose physical activities that increase your heart rate  Examples include walking, hiking, swimming, and riding a bike  Ask your healthcare provider about the best exercise plan for you  © Copyright [employer] 2022 Information is for End User's use only and may not be sold, redistributed or otherwise used for commercial purposes  The above information is an  only  It is not intended as medical advice for individual conditions or treatments  Talk to your doctor, nurse or pharmacist before following any medical regimen to see if it is safe and effective for you

## 2023-05-09 ENCOUNTER — OFFICE VISIT (OUTPATIENT)
Dept: FAMILY MEDICINE CLINIC | Facility: CLINIC | Age: 38
End: 2023-05-09

## 2023-05-09 VITALS
OXYGEN SATURATION: 99 % | TEMPERATURE: 97.9 F | HEIGHT: 69 IN | WEIGHT: 175.6 LBS | DIASTOLIC BLOOD PRESSURE: 76 MMHG | BODY MASS INDEX: 26.01 KG/M2 | SYSTOLIC BLOOD PRESSURE: 130 MMHG | HEART RATE: 91 BPM

## 2023-05-09 DIAGNOSIS — I82.411 ACUTE DEEP VEIN THROMBOSIS (DVT) OF FEMORAL VEIN OF RIGHT LOWER EXTREMITY (HCC): ICD-10-CM

## 2023-05-09 DIAGNOSIS — G47.9 SLEEP DISTURBANCE: ICD-10-CM

## 2023-05-09 DIAGNOSIS — Z13.9 SCREENING DUE: Primary | ICD-10-CM

## 2023-05-09 DIAGNOSIS — Z95.5 HISTORY OF CORONARY ARTERY STENT PLACEMENT: ICD-10-CM

## 2023-05-09 RX ORDER — ZOLPIDEM TARTRATE 10 MG/1
10 TABLET ORAL
Qty: 30 TABLET | Refills: 0 | Status: SHIPPED | OUTPATIENT
Start: 2023-05-09

## 2023-05-09 RX ORDER — ZOLPIDEM TARTRATE 10 MG/1
10 TABLET ORAL
Qty: 30 TABLET | Refills: 0 | Status: SHIPPED | OUTPATIENT
Start: 2023-05-09 | End: 2023-05-09 | Stop reason: SDUPTHER

## 2023-05-09 NOTE — ASSESSMENT & PLAN NOTE
Cardiology recommends high in intensity statin, metoprolol succinate 25 mg, ticagrelor  He was also found to have a DVT right before discharge in Evidence of focal, non-occlusive acute vs subacute thrombus noted in the right  common femoral vein    So he was started on Eliquis prior to discharge therefore cardiology recommended to do triple therapy with Eliquis and not aspirin

## 2023-05-09 NOTE — ASSESSMENT & PLAN NOTE
· Patient has a long history of a sleep disorder  He has been prescribed 10 mg of Ambien per night     · Patient signed a new controlled substance agreement form     Plan   · Continue Ambien   · Reassess at next office visit

## 2023-05-09 NOTE — ASSESSMENT & PLAN NOTE
· Patient presented to the ED via EMS due to STEMI  · ST elevation in leads 2,3, aVF   · LDL of 216  · Complete occlusion of RCA   · Partial occlusion of proximal circumflex and OM1   · Stents were placed in RCA, proximal circumflex and OM1     Plan  · Discussed with patient   · Continue Eliquis and Brilinta     · Continue Lipitor   · Metoprolol  · Follow-up as needed

## 2023-05-09 NOTE — PROGRESS NOTES
Assessment/Plan:     Problem List Items Addressed This Visit        Cardiovascular and Mediastinum    Acute deep vein thrombosis (DVT) of femoral vein of right lower extremity (HCC)     Patient was found to have a DVT prior to discharge from hospital   Common femoral vein on right leg - non-occlusive     Plan   Continue Eliquis 5 mg BID  Reassess at next office visit             Other    Sleep disturbance (Chronic)     Patient has a long history of a sleep disorder  He has been prescribed 10 mg of Ambien per night  Patient signed a new controlled substance agreement form     Plan   Continue Ambien   Reassess at next office visit          Relevant Medications    zolpidem (AMBIEN) 10 mg tablet    History of coronary artery stent placement     Patient presented to the ED via EMS due to STEMI  ST elevation in leads 2,3, aVF   LDL of 216  Complete occlusion of RCA   Partial occlusion of proximal circumflex and OM1   Stents were placed in RCA, proximal circumflex and OM1     Plan  Discussed with patient   Continue Eliquis and Brilinta  Continue Lipitor   Metoprolol  Follow-up as needed         Other Visit Diagnoses     Screening due    -  Primary    Relevant Orders    Comprehensive metabolic panel        Return in about 3 months (around 8/9/2023)  Subjective:   Nany is a 40 y o  male here today for a hospital follow-up    Patient Active Problem List   Diagnosis   • Intellectual disability   • BMI 27 0-27 9,adult   • Depression   • Sleep disturbance   • Gastroesophageal reflux disease   • Hyperlipemia   • STEMI (ST elevation myocardial infarction) ([facility] [address] )   • History of coronary artery stent placement   • Acute deep vein thrombosis (DVT) of femoral vein of right lower extremity (HCC)        Current medications:  Current Outpatient Medications   Medication Sig Dispense Refill   • apixaban (Eliquis) 5 mg Take 2 tablets (10 mg total) by mouth 2 (two) times a day for 7 days, THEN 1 tablet (5 mg total) 2 (two) times a day for 23 days  74 tablet 0   • atorvastatin (LIPITOR) 80 mg tablet Take 1 tablet (80 mg total) by mouth every evening 90 tablet 3   • metoprolol succinate (TOPROL-XL) 25 mg 24 hr tablet Take 1 tablet (25 mg total) by mouth daily 90 tablet 3   • nitroglycerin (NITROSTAT) 0 4 mg SL tablet Place 1 tablet (0 4 mg total) under the tongue every 5 (five) minutes as needed for chest pain for up to 30 doses 30 tablet 0   • omeprazole (PriLOSEC) 20 mg delayed release capsule Take 1 capsule (20 mg total) by mouth daily before breakfast 90 capsule 3   • ticagrelor (BRILINTA) 90 MG Take 1 tablet (90 mg total) by mouth every 12 (twelve) hours 60 tablet 11   • zolpidem (AMBIEN) 10 mg tablet Take 1 tablet (10 mg total) by mouth daily at bedtime as needed for sleep 30 tablet 0   • citalopram (CeleXA) 20 mg tablet take 1 tablet by mouth once daily 30 tablet 0     No current facility-administered medications for this visit  HPI:   Chief Complaint   Patient presents with   • Permit Physical     Well check      -- Above per clinical staff and reviewed  --    TCM Call     Date and time call was made  4/28/2023 11:42 AM    Patient was hospitialized at  [address] [place] [address]    Date of Admission  04/25/23    Date of discharge  04/27/23    Diagnosis  STEMI (ST elevation myocardial infarction) [facility])    Disposition  Home    Current Symptoms  None      TCM Call     Post hospital issues  None    Should patient be enrolled in anticoag monitoring? No    Scheduled for follow up?   No    Did you obtain your prescribed medications  Yes    Do you need help managing your prescriptions or medications  No    Is transportation to your appointment needed  No    Living Arrangements  Alone    Support System  None    Are you recieving any outpatient services  No    Are you recieving home care services  No    Are you using any community resources  No    Current waiver services  No    Have you fallen in the last 12 months  No    Interperter language line needed  No          Hospital Course:  Jeffrey Genao is a 40 y o  male patient who originally presented to the hospital on 4/25/2023 due to chest pain  [facility] with associated symptom of numbness in his bilateral upper extremities, nausea,1-episode of vomiting, and shortness of breath  Patient was brought in by EMS where his EKG showed ST elevation myocardial elevation (STEMI) in the inferior leads  Symptoms resolved with 1 dose of aspirin and nitroglycerin  Patient was immediately evaluated in ED and a STEMI alert was called; cardiology was consulted and he was taken hastily to the cardiac catheterization lab where complete occulusion of the RCA (100%), proximal circumflex *80%), and OM1 (70%) was visualized  PASTORA were placed in each of the occluded vessels without any complication  Cardiology recommended initiating DAPT, statin, beta-blockade, and obtaining a post-cath ECHO after procedure  Post-procedure day 1, the patient reported sharp pain in his right groin and a VAS pseudoaneurysm study was obtained: Evidence of focal, non-occlusive acute vs subacute thrombus was noted in his right common femoral vein  The patient was determined to need triple therapy at discharge and will have close follow up with his PCP and Cardiologist   Patient's parents were notified about the appointments, medications, and need for cardiac rehab for which they acknowledged the treatment plan  Today:    Nany presents today for hospital follow-up visit  Denies any new problems, any complications since hospital discharge  Reports adherence to his medication regimen  Patient was contacted within 2 business days  Medications were reconciled  Controlled substance agreement was reviewed and signed again   Hospital discharge summary was reviewed      As part of this post-hospital visit, records from the hospital, including history and physical examination, discharge "summary, all laboratory tests and radiographic studies was reviewed with this patient  The following portions of the patient's history were reviewed and updated as appropriate: allergies, current medications, past family history, past medical history, past social history, past surgical history and problem list     Objective:  Vitals:  /76 (BP Location: Right arm, Patient Position: Sitting, Cuff Size: Standard)   Pulse 91   Temp 97 9 °F (36 6 °C) (Tympanic)   Ht 5' 9\" (1 753 m)   Wt 79 7 kg (175 lb 9 6 oz)   SpO2 99%   BMI 25 93 kg/m²    Wt Readings from Last 3 Encounters:   05/09/23 79 7 kg (175 lb 9 6 oz)   05/08/23 77 3 kg (170 lb 6 4 oz)   04/26/23 77 7 kg (171 lb 4 8 oz)      BP Readings from Last 3 Encounters:   05/09/23 130/76   05/08/23 132/80   04/27/23 107/61        Review of Systems   Constitutional: Negative for chills, fatigue, fever and unexpected weight change  HENT: Negative for congestion, sinus pressure, sinus pain and sore throat  Eyes: Negative for pain and visual disturbance  Respiratory: Negative for cough and shortness of breath  Cardiovascular: Negative for chest pain and palpitations  Gastrointestinal: Negative for abdominal pain, blood in stool, constipation, diarrhea, nausea and vomiting  Endocrine: Negative for polydipsia and polyuria  Genitourinary: Negative for dysuria  Musculoskeletal: Negative for arthralgias and neck stiffness  Skin: Negative for rash  Allergic/Immunologic: Negative for environmental allergies  Neurological: Negative for weakness, numbness and headaches  Psychiatric/Behavioral: The patient is not nervous/anxious  He has no other concerns  No unexpected weight changes  No chest pain, SOB, or palpitations  No GERD  No changes in bowels or bladder  Sleeping well  No mood changes  Physical Exam  Constitutional:       Appearance: Normal appearance  He is normal weight  HENT:      Head: Normocephalic and atraumatic        " Right Ear: Tympanic membrane, ear canal and external ear normal       Left Ear: Tympanic membrane, ear canal and external ear normal       Nose: Nose normal       Mouth/Throat:      Mouth: Mucous membranes are moist       Pharynx: Oropharynx is clear  Eyes:      Extraocular Movements: Extraocular movements intact  Conjunctiva/sclera: Conjunctivae normal       Pupils: Pupils are equal, round, and reactive to light  Neck:      Vascular: No carotid bruit  Cardiovascular:      Rate and Rhythm: Normal rate and regular rhythm  Pulses: Normal pulses  Heart sounds: Normal heart sounds  No murmur heard  Pulmonary:      Effort: Pulmonary effort is normal       Breath sounds: Normal breath sounds  No wheezing  Abdominal:      General: Abdomen is flat  Bowel sounds are normal       Palpations: Abdomen is soft  Comments: R/S post-cath hematoma: C/D/I, healing well, no fluctuance, no pain on palpation; mildly erythematous  Musculoskeletal:         General: Normal range of motion  Cervical back: Normal range of motion and neck supple  No tenderness  Right lower leg: No edema  Left lower leg: No edema  Lymphadenopathy:      Cervical: No cervical adenopathy  Skin:     General: Skin is warm and dry  Findings: No rash  Neurological:      General: No focal deficit present  Mental Status: He is alert and oriented to person, place, and time  Psychiatric:         Mood and Affect: Mood normal          Behavior: Behavior normal          Thought Content:  Thought content normal          Judgment: Judgment normal

## 2023-05-09 NOTE — ASSESSMENT & PLAN NOTE
· Patient was found to have a DVT prior to discharge from hospital   · Common femoral vein on right leg - non-occlusive     Plan   · Continue Eliquis 5 mg BID  · Reassess at next office visit

## 2023-05-22 DIAGNOSIS — F32.A DEPRESSION, UNSPECIFIED DEPRESSION TYPE: ICD-10-CM

## 2023-05-22 RX ORDER — CITALOPRAM 20 MG/1
TABLET ORAL
Qty: 30 TABLET | Refills: 0 | Status: SHIPPED | OUTPATIENT
Start: 2023-05-22

## 2023-06-05 DIAGNOSIS — K21.9 GASTROESOPHAGEAL REFLUX DISEASE, UNSPECIFIED WHETHER ESOPHAGITIS PRESENT: ICD-10-CM

## 2023-06-05 DIAGNOSIS — G47.9 SLEEP DISTURBANCE: ICD-10-CM

## 2023-06-08 RX ORDER — ZOLPIDEM TARTRATE 10 MG/1
10 TABLET ORAL
Qty: 30 TABLET | Refills: 0 | Status: SHIPPED | OUTPATIENT
Start: 2023-06-08

## 2023-06-08 RX ORDER — OMEPRAZOLE 20 MG/1
20 CAPSULE, DELAYED RELEASE ORAL
Qty: 90 CAPSULE | Refills: 3 | Status: SHIPPED | OUTPATIENT
Start: 2023-06-08

## 2023-06-12 DIAGNOSIS — I82.411 ACUTE DEEP VEIN THROMBOSIS (DVT) OF FEMORAL VEIN OF RIGHT LOWER EXTREMITY (HCC): ICD-10-CM

## 2023-06-12 DIAGNOSIS — I21.3 STEMI (ST ELEVATION MYOCARDIAL INFARCTION) (HCC): ICD-10-CM

## 2023-06-14 DIAGNOSIS — I21.3 STEMI (ST ELEVATION MYOCARDIAL INFARCTION) (HCC): ICD-10-CM

## 2023-06-14 DIAGNOSIS — I82.411 ACUTE DEEP VEIN THROMBOSIS (DVT) OF FEMORAL VEIN OF RIGHT LOWER EXTREMITY (HCC): ICD-10-CM

## 2023-06-23 DIAGNOSIS — F32.A DEPRESSION, UNSPECIFIED DEPRESSION TYPE: ICD-10-CM

## 2023-06-23 RX ORDER — CITALOPRAM 20 MG/1
TABLET ORAL
Qty: 30 TABLET | Refills: 0 | Status: SHIPPED | OUTPATIENT
Start: 2023-06-23

## 2023-06-27 DIAGNOSIS — F32.A DEPRESSION, UNSPECIFIED DEPRESSION TYPE: ICD-10-CM

## 2023-06-27 RX ORDER — CITALOPRAM 20 MG/1
20 TABLET ORAL DAILY
Qty: 30 TABLET | Refills: 0 | OUTPATIENT
Start: 2023-06-27

## 2023-07-05 DIAGNOSIS — G47.9 SLEEP DISTURBANCE: ICD-10-CM

## 2023-07-05 RX ORDER — ZOLPIDEM TARTRATE 10 MG/1
10 TABLET ORAL
Qty: 30 TABLET | Refills: 0 | Status: SHIPPED | OUTPATIENT
Start: 2023-07-05

## 2023-07-23 DIAGNOSIS — F32.A DEPRESSION, UNSPECIFIED DEPRESSION TYPE: ICD-10-CM

## 2023-07-24 DIAGNOSIS — I21.3 STEMI (ST ELEVATION MYOCARDIAL INFARCTION) (HCC): ICD-10-CM

## 2023-07-24 DIAGNOSIS — E78.2 MODERATE MIXED HYPERLIPIDEMIA NOT REQUIRING STATIN THERAPY: Chronic | ICD-10-CM

## 2023-07-24 RX ORDER — CITALOPRAM 20 MG/1
TABLET ORAL
Qty: 30 TABLET | Refills: 0 | Status: SHIPPED | OUTPATIENT
Start: 2023-07-24

## 2023-07-25 RX ORDER — NITROGLYCERIN 0.4 MG/1
0.4 TABLET SUBLINGUAL
Qty: 30 TABLET | Refills: 5 | Status: SHIPPED | OUTPATIENT
Start: 2023-07-25

## 2023-07-27 ENCOUNTER — TELEPHONE (OUTPATIENT)
Dept: FAMILY MEDICINE CLINIC | Facility: CLINIC | Age: 38
End: 2023-07-27

## 2023-07-27 NOTE — TELEPHONE ENCOUNTER
Encounter for forms      Patient requires a form to be completed. Patient is aware of 7-10 day turn around time. Please refer to the following information:       Type of Form: [facility]     Date of Visit (if applicable): 0/4/59    Doctor: Gita Thurman    Expected date: How patient would like to receive form:    Fax number:     Patient phone number:       Copy scanned to encounter. Copy provided to patient. Original in (X) team folder to be completed.

## 2023-08-09 ENCOUNTER — TELEPHONE (OUTPATIENT)
Dept: FAMILY MEDICINE CLINIC | Facility: CLINIC | Age: 38
End: 2023-08-09

## 2023-08-09 NOTE — TELEPHONE ENCOUNTER
Patient states his heartburn is getting so severe that its causing him to throw up  he states  may need something stronger Please advise

## 2023-08-14 DIAGNOSIS — G47.9 SLEEP DISTURBANCE: ICD-10-CM

## 2023-08-16 DIAGNOSIS — G47.9 SLEEP DISTURBANCE: ICD-10-CM

## 2023-08-16 RX ORDER — ZOLPIDEM TARTRATE 10 MG/1
10 TABLET ORAL
Qty: 30 TABLET | Refills: 0 | Status: SHIPPED | OUTPATIENT
Start: 2023-08-16

## 2023-08-21 DIAGNOSIS — F32.A DEPRESSION, UNSPECIFIED DEPRESSION TYPE: ICD-10-CM

## 2023-08-21 RX ORDER — CITALOPRAM 20 MG/1
TABLET ORAL
Qty: 30 TABLET | Refills: 0 | Status: SHIPPED | OUTPATIENT
Start: 2023-08-21 | End: 2023-08-24 | Stop reason: SDUPTHER

## 2023-08-24 DIAGNOSIS — F32.A DEPRESSION, UNSPECIFIED DEPRESSION TYPE: ICD-10-CM

## 2023-08-25 RX ORDER — ZOLPIDEM TARTRATE 10 MG/1
10 TABLET ORAL
Qty: 30 TABLET | Refills: 0 | OUTPATIENT
Start: 2023-08-25

## 2023-08-25 RX ORDER — CITALOPRAM 20 MG/1
20 TABLET ORAL DAILY
Qty: 90 TABLET | Refills: 0 | Status: SHIPPED | OUTPATIENT
Start: 2023-08-25 | End: 2023-08-28 | Stop reason: SDUPTHER

## 2023-08-28 DIAGNOSIS — F32.A DEPRESSION, UNSPECIFIED DEPRESSION TYPE: ICD-10-CM

## 2023-08-28 RX ORDER — CITALOPRAM 20 MG/1
20 TABLET ORAL DAILY
Qty: 90 TABLET | Refills: 0 | Status: SHIPPED | OUTPATIENT
Start: 2023-08-28 | End: 2023-09-06 | Stop reason: SDUPTHER

## 2023-08-28 NOTE — TELEPHONE ENCOUNTER
The patient called the office inference to his medication needing to be refilled. I called the Pharmacy and they states the never receive the prescription. They are asking for the doctor to resubmit the prescription.

## 2023-09-06 DIAGNOSIS — F32.A DEPRESSION, UNSPECIFIED DEPRESSION TYPE: ICD-10-CM

## 2023-09-06 NOTE — TELEPHONE ENCOUNTER
Patient went to the pharmacy to [facility] medication Pharmacist  stated the medication was Canceled and is requesting a new script I also spoke with the pharmacy and the Pharmacist told me the same thing  please review and advise

## 2023-09-07 RX ORDER — CITALOPRAM 20 MG/1
20 TABLET ORAL DAILY
Qty: 90 TABLET | Refills: 0 | Status: SHIPPED | OUTPATIENT
Start: 2023-09-07

## 2023-09-13 DIAGNOSIS — G47.9 SLEEP DISTURBANCE: ICD-10-CM

## 2023-09-13 RX ORDER — ZOLPIDEM TARTRATE 10 MG/1
10 TABLET ORAL
Qty: 30 TABLET | Refills: 0 | Status: SHIPPED | OUTPATIENT
Start: 2023-09-15

## 2023-09-13 NOTE — TELEPHONE ENCOUNTER
Reviewed PDMP, refill appropriate for fill date 9/15/23. Has controlled substance agreement on file with Dr. Josep Talley from May 2023.

## 2023-09-15 ENCOUNTER — TELEPHONE (OUTPATIENT)
Dept: FAMILY MEDICINE CLINIC | Facility: CLINIC | Age: 38
End: 2023-09-15

## 2023-09-29 ENCOUNTER — OFFICE VISIT (OUTPATIENT)
Dept: FAMILY MEDICINE CLINIC | Facility: CLINIC | Age: 38
End: 2023-09-29
Payer: MEDICARE

## 2023-09-29 VITALS
HEIGHT: 69 IN | DIASTOLIC BLOOD PRESSURE: 70 MMHG | OXYGEN SATURATION: 98 % | TEMPERATURE: 98.7 F | SYSTOLIC BLOOD PRESSURE: 108 MMHG | BODY MASS INDEX: 25.92 KG/M2 | WEIGHT: 175 LBS | HEART RATE: 92 BPM

## 2023-09-29 DIAGNOSIS — Z95.5 HISTORY OF CORONARY ARTERY STENT PLACEMENT: ICD-10-CM

## 2023-09-29 DIAGNOSIS — E78.00 PURE HYPERCHOLESTEROLEMIA: Chronic | ICD-10-CM

## 2023-09-29 DIAGNOSIS — I21.3 ST ELEVATION MYOCARDIAL INFARCTION (STEMI), UNSPECIFIED ARTERY (HCC): Primary | ICD-10-CM

## 2023-09-29 PROCEDURE — 99213 OFFICE O/P EST LOW 20 MIN: CPT

## 2023-09-29 NOTE — PROGRESS NOTES
Name: Willy Morrow      : 1985      MRN: [id number]  Encounter Provider: Shefali Arevalo DO  Encounter Date: 2023   Encounter department: [facility]    Assessment & Plan     1. ST elevation myocardial infarction (STEMI), unspecified artery (HCC)  Assessment & Plan:  S/P PCI PASTORA x 3 (RCA, proximal circumflex and OM1). Patient reported that he has not had anyone from cardiology reach out to him about following up nor has he received any phone calls from cardiology / cardiac rehab for scheduling appointment  Reported being adherent to his medication regimen since discharge   Parents and family assisting patient at home  Denied any symptoms since discharge and overall mentioned being in good health again  Maintaining dietary regimen as discussed since last office visit    Plan:  Follow up with Cardiology referral   Continue medication regimen   Monitor symptoms at home and counseled on seeking immediate medical attention if symptoms return/develop again  Reassess in 1-month / next office visit. Orders:  -     Comprehensive metabolic panel; Future  -     CBC and differential; Future    2. Pure hypercholesterolemia  Assessment & Plan:  Continue Lipitor 80 mg - denied any side effects since starting medication  Follow up repeat Lipid Panel CMP   Continue dietary modifications as discussed  Follow up with cardiology   Reassess at next office visit. Orders:  -     Lipid Panel with Direct LDL reflex; Future    3. History of coronary artery stent placement  Assessment & Plan:  Continue medication regimen with Lipitor 80 mg PO QD, Metoprolol 25 mg PO Q24H, Eliquis 5 mg PO BID, and Brilinta 90 mg PO Q12H  Has not seen Cardiology since discharge/procedure  Denied any complications/symptom development since procedure    Plan:  Follow up with Cardiology referral   Continue medication regimen  Reassess at next office visit.       Orders:  -     Comprehensive metabolic panel; Future  -     CBC and differential; Future           Subjective      49-year-old male patient s/p 3 PASTORA placement in Mid RCA, Proximal circumflex and first marginal artery (OM1) on 04/25/2023 after presenting to ED for chest pain - found to have inferior STEMI - presents to the clinic for follow up. Denies any chest pain/nor any cardiac symptoms today - denies any complications since stent placement. Says no cardiology follow up since procedure, and says he hasn't been called about anything for cardiac rehab. Says he has been compliant with regimen since hospitalization/discharge and denies any side effects from them. Denies any other symptoms / complaints today. Review of Systems   Constitutional:  Negative for activity change and fatigue. HENT: Negative. Eyes: Negative. Respiratory:  Negative for apnea, choking, chest tightness, shortness of breath and wheezing. Cardiovascular:  Negative for chest pain, palpitations and leg swelling. Gastrointestinal: Negative. Genitourinary:  Negative for dysuria. Musculoskeletal: Negative. Skin: Negative. Neurological:  Negative for headaches. Psychiatric/Behavioral:  Negative for dysphoric mood. The patient is not nervous/anxious.         Current Outpatient Medications on File Prior to Visit   Medication Sig    atorvastatin (LIPITOR) 80 mg tablet Take 1 tablet (80 mg total) by mouth every evening    citalopram (CeleXA) 20 mg tablet Take 1 tablet (20 mg total) by mouth daily    metoprolol succinate (TOPROL-XL) 25 mg 24 hr tablet Take 1 tablet (25 mg total) by mouth daily    nitroglycerin (NITROSTAT) 0.4 mg SL tablet Place 1 tablet (0.4 mg total) under the tongue every 5 (five) minutes as needed for chest pain for up to 30 doses    ticagrelor (BRILINTA) 90 MG Take 1 tablet (90 mg total) by mouth every 12 (twelve) hours    apixaban (Eliquis) 5 mg Take 1 tablet (5 mg total) by mouth 2 (two) times a day       Objective     /70 (BP Location: Left arm, Patient Position: Sitting, Cuff Size: Standard)   Pulse 92   Temp 98.7 °F (37.1 °C) (Tympanic)   Ht 5' 9" (1.753 m)   Wt 79.4 kg (175 lb)   SpO2 98%   BMI 25.84 kg/m²     Physical Exam  Constitutional:       Appearance: Normal appearance. He is normal weight. HENT:      Head: Normocephalic and atraumatic. Right Ear: Tympanic membrane, ear canal and external ear normal.      Left Ear: Tympanic membrane, ear canal and external ear normal.      Nose: Nose normal.      Mouth/Throat:      Mouth: Mucous membranes are moist.      Pharynx: Oropharynx is clear. Eyes:      Extraocular Movements: Extraocular movements intact. Conjunctiva/sclera: Conjunctivae normal.      Pupils: Pupils are equal, round, and reactive to light. Neck:      Vascular: No carotid bruit. Cardiovascular:      Rate and Rhythm: Normal rate and regular rhythm. Pulses: Normal pulses. Heart sounds: Normal heart sounds. No murmur heard. Pulmonary:      Effort: Pulmonary effort is normal.      Breath sounds: Normal breath sounds. No wheezing. Abdominal:      General: Abdomen is flat. Bowel sounds are normal.      Palpations: Abdomen is soft. Musculoskeletal:         General: Normal range of motion. Cervical back: Normal range of motion and neck supple. No tenderness. Right lower leg: No edema. Left lower leg: No edema. Lymphadenopathy:      Cervical: No cervical adenopathy. Skin:     General: Skin is warm and dry. Findings: No rash. Neurological:      General: No focal deficit present. Mental Status: He is alert and oriented to person, place, and time. Psychiatric:         Mood and Affect: Mood normal.         Behavior: Behavior normal.         Thought Content:  Thought content normal.         Judgment: Judgment normal.       Tiburcio Harris DO

## 2023-10-12 DIAGNOSIS — G47.9 SLEEP DISTURBANCE: ICD-10-CM

## 2023-10-12 RX ORDER — ZOLPIDEM TARTRATE 10 MG/1
10 TABLET ORAL
Qty: 30 TABLET | Refills: 0 | Status: SHIPPED | OUTPATIENT
Start: 2023-10-12 | End: 2023-10-13 | Stop reason: SDUPTHER

## 2023-10-13 DIAGNOSIS — G47.9 SLEEP DISTURBANCE: ICD-10-CM

## 2023-10-13 RX ORDER — ZOLPIDEM TARTRATE 10 MG/1
10 TABLET ORAL
Qty: 30 TABLET | Refills: 0 | Status: SHIPPED | OUTPATIENT
Start: 2023-10-13

## 2023-10-13 NOTE — TELEPHONE ENCOUNTER
Caller: Patient      Doctor: Felix Chung      Reason for call: Patient call requesting to change his pharmacy for the medication that was sent yesterday.  Patient wants his AMBIEN 10 mg sent to [facility] on [facility].       Number: [phone]

## 2023-11-09 DIAGNOSIS — K21.9 GASTROESOPHAGEAL REFLUX DISEASE, UNSPECIFIED WHETHER ESOPHAGITIS PRESENT: ICD-10-CM

## 2023-11-09 RX ORDER — OMEPRAZOLE 20 MG/1
20 CAPSULE, DELAYED RELEASE ORAL
Qty: 90 CAPSULE | Refills: 3 | Status: SHIPPED | OUTPATIENT
Start: 2023-11-09 | End: 2023-11-17 | Stop reason: SDUPTHER

## 2023-11-13 DIAGNOSIS — G47.9 SLEEP DISTURBANCE: ICD-10-CM

## 2023-11-13 RX ORDER — ZOLPIDEM TARTRATE 10 MG/1
10 TABLET ORAL
Qty: 30 TABLET | Refills: 0 | Status: CANCELLED | OUTPATIENT
Start: 2023-11-13

## 2023-11-14 DIAGNOSIS — G47.9 SLEEP DISTURBANCE: ICD-10-CM

## 2023-11-14 RX ORDER — ZOLPIDEM TARTRATE 10 MG/1
10 TABLET ORAL
Qty: 30 TABLET | Refills: 0 | Status: SHIPPED | OUTPATIENT
Start: 2023-11-14

## 2023-11-17 ENCOUNTER — OFFICE VISIT (OUTPATIENT)
Dept: FAMILY MEDICINE CLINIC | Facility: CLINIC | Age: 38
End: 2023-11-17
Payer: MEDICARE

## 2023-11-17 VITALS
TEMPERATURE: 98.2 F | DIASTOLIC BLOOD PRESSURE: 72 MMHG | SYSTOLIC BLOOD PRESSURE: 118 MMHG | OXYGEN SATURATION: 98 % | BODY MASS INDEX: 25.83 KG/M2 | HEART RATE: 107 BPM | HEIGHT: 69 IN | WEIGHT: 174.4 LBS

## 2023-11-17 DIAGNOSIS — I21.3 ST ELEVATION MYOCARDIAL INFARCTION (STEMI), UNSPECIFIED ARTERY (HCC): Primary | ICD-10-CM

## 2023-11-17 DIAGNOSIS — Z95.820 STATUS POST ANGIOPLASTY WITH STENT: ICD-10-CM

## 2023-11-17 DIAGNOSIS — Z23 ENCOUNTER FOR IMMUNIZATION: ICD-10-CM

## 2023-11-17 DIAGNOSIS — K21.9 GASTROESOPHAGEAL REFLUX DISEASE, UNSPECIFIED WHETHER ESOPHAGITIS PRESENT: ICD-10-CM

## 2023-11-17 PROCEDURE — G0008 ADMIN INFLUENZA VIRUS VAC: HCPCS

## 2023-11-17 PROCEDURE — 90686 IIV4 VACC NO PRSV 0.5 ML IM: CPT

## 2023-11-17 PROCEDURE — 99213 OFFICE O/P EST LOW 20 MIN: CPT

## 2023-11-17 RX ORDER — OMEPRAZOLE 20 MG/1
20 CAPSULE, DELAYED RELEASE ORAL
Qty: 90 CAPSULE | Refills: 3 | Status: SHIPPED | OUTPATIENT
Start: 2023-11-17

## 2023-11-17 NOTE — PROGRESS NOTES
Name: Zeeshan Butt      : 1985      MRN: [id number]  Encounter Provider: David Aguilar DO  Encounter Date: 2023   Encounter department: [facility]    Assessment & Plan     1. Status post angioplasty with stent  Assessment & Plan:  S/P PCI PASTORA x 3 (RCA, proximal circumflex and OM1). No symptoms since discharge  Reports compliance with medication regimen  Has not been seen by Cardiology since hospitalization  Examination WNL     Plan:  Continue medication regimen  Follow up with Cardiology referral  Reassess at next office visit. Orders:  -     Ambulatory Referral to Cardiology; Future    2. Gastroesophageal reflux disease, unspecified whether esophagitis present  Assessment & Plan:  Continue omeprazole 20 mg PO QAM   Reassess at next office visit. Orders:  -     omeprazole (PriLOSEC) 20 mg delayed release capsule; Take 1 capsule (20 mg total) by mouth daily before breakfast    3. Encounter for immunization  -     influenza vaccine, quadrivalent, 0.5 mL, preservative-free, for adult and pediatric patients 6 mos+ (AFLURIA, FLUARIX, FLULAVAL, FLUZONE)         Nutrition Assessment and Intervention:     Recommended completion of food recall journal      Physical Activity Assessment and Intervention:    Activity journal completion recommended      Emotional and Mental Well-being, Sleep, Connectedness Assessment and Intervention:    Sleep/stress assessment performed      Therapeutic Lifestyle Change Visit:     One-on-one comprehensive counseling, coaching, and health behavior change visit completed        Subjective     80-year-old male patient presents to the office for follow up on his post-cath care/cardiology referral and for refill of heartburn medication (Omeprazole). Reports no recent chest pain, says he has been able to manage his daily activities, and says he has been helping out at his [employer] recently.   Reports engaging in regular daily exercise regimen, and says he has been eating a well-balanced as discussed at last office visit. Reports being compliant with his post-hospitalization medication regimen. With respect to his GERD, he reports experiencing heart-burn after consuming spicy and acidic foods, such as ketchup and cheese, and says he had a recent episode of vomiting after eating dark chocolate. Says ever since he has been trying to eat , his symptoms have reduced, but says he still has some days where he experiences symptoms. Reports Omeprazole takes away any GERD-symptoms and says he needs a refill on his medication. Denies any other symptoms/concerns today. Review of Systems   Constitutional:  Negative for activity change, appetite change and fatigue. HENT: Negative. Eyes: Negative. Respiratory:  Negative for cough, choking, chest tightness, shortness of breath and wheezing. Cardiovascular:  Negative for chest pain, palpitations and leg swelling. Gastrointestinal:  Negative for abdominal pain, constipation and diarrhea. Genitourinary:  Negative for dysuria. Musculoskeletal:  Negative for back pain and neck pain. Skin:  Negative for rash. Neurological:  Negative for headaches. Hematological:  Negative for adenopathy. Does not bruise/bleed easily. Psychiatric/Behavioral:  Negative for dysphoric mood. The patient is not nervous/anxious. Past Medical History:   Diagnosis Date    Allergic     Anxiety     Depression     Diverticulitis     Diverticulitis of colon     GERD (gastroesophageal reflux disease)     Insomnia      Past Surgical History:   Procedure Laterality Date    CARDIAC CATHETERIZATION N/A 4/25/2023    Procedure: Cardiac pci;  Surgeon: Shaniqua Wells MD;  [facility];   Service: Cardiology    NO PAST SURGERIES       Family History   Problem Relation Age of Onset    Depression Mother     Anxiety disorder Mother     Depression Father     Anxiety disorder Father     Prostate cancer Father Cancer Father     Cancer Paternal Grandfather      Social History     Socioeconomic History    Marital status: Single     Spouse name: None    Number of children: None    Years of education: None    Highest education level: None   Occupational History    None   Tobacco Use    Smoking status: Never    Smokeless tobacco: Never   Vaping Use    Vaping Use: Never used   Substance and Sexual Activity    Alcohol use: Yes     Comment: occasionally    Drug use: Never    Sexual activity: Yes     Birth control/protection: Condom Male   Other Topics Concern    None   Social History Narrative    None     Social Determinants of Health     Financial Resource Strain: Low Risk  (3/3/2023)    Overall Financial Resource Strain (CARDIA)     Difficulty of Paying Living Expenses: Not hard at all   Food Insecurity: No Food Insecurity (4/27/2023)    Hunger Vital Sign     Worried About Running Out of Food in the Last Year: Never true     Ran Out of Food in the Last Year: Never true   Transportation Needs: No Transportation Needs (4/27/2023)    PRAPARE - Transportation     Lack of Transportation (Medical): No     Lack of Transportation (Non-Medical):  No   Physical Activity: Not on file   Stress: Not on file   Social Connections: Not on file   Intimate Partner Violence: Not on file   Housing Stability: Low Risk  (4/27/2023)    Housing Stability Vital Sign     Unable to Pay for Housing in the Last Year: No     Number of Places Lived in the Last Year: 1     Unstable Housing in the Last Year: No     Current Outpatient Medications on File Prior to Visit   Medication Sig    apixaban (Eliquis) 5 mg Take 1 tablet (5 mg total) by mouth 2 (two) times a day    atorvastatin (LIPITOR) 80 mg tablet Take 1 tablet (80 mg total) by mouth every evening    citalopram (CeleXA) 20 mg tablet Take 1 tablet (20 mg total) by mouth daily    metoprolol succinate (TOPROL-XL) 25 mg 24 hr tablet Take 1 tablet (25 mg total) by mouth daily    nitroglycerin (NITROSTAT) 0.4 mg SL tablet Place 1 tablet (0.4 mg total) under the tongue every 5 (five) minutes as needed for chest pain for up to 30 doses    ticagrelor (BRILINTA) 90 MG Take 1 tablet (90 mg total) by mouth every 12 (twelve) hours    zolpidem (AMBIEN) 10 mg tablet Take 1 tablet (10 mg total) by mouth daily at bedtime as needed for sleep     No Known Allergies  Immunization History   Administered Date(s) Administered    COVID-19 MODERNA VACC 0.5 ML IM 05/26/2021    H1N1, All Formulations 01/11/2010    INFLUENZA 10/21/2014, 10/07/2015, 10/03/2016, 10/05/2017, 10/11/2018, 09/29/2020    Influenza, injectable, quadrivalent, preservative free 0.5 mL 12/03/2021, 11/17/2023    Tdap 10/05/2017       Objective     /72 (BP Location: Left arm, Patient Position: Sitting, Cuff Size: Large)   Pulse (!) 107   Temp 98.2 °F (36.8 °C) (Tympanic)   Ht 5' 9" (1.753 m)   Wt 79.1 kg (174 lb 6.4 oz)   SpO2 98%   BMI 25.75 kg/m²     Physical Exam  Constitutional:       Appearance: Normal appearance. He is normal weight. HENT:      Head: Normocephalic and atraumatic. Right Ear: Tympanic membrane, ear canal and external ear normal.      Left Ear: Tympanic membrane, ear canal and external ear normal.      Nose: Nose normal.      Mouth/Throat:      Mouth: Mucous membranes are moist.      Pharynx: Oropharynx is clear. Eyes:      Extraocular Movements: Extraocular movements intact. Conjunctiva/sclera: Conjunctivae normal.      Pupils: Pupils are equal, round, and reactive to light. Neck:      Vascular: No carotid bruit. Cardiovascular:      Rate and Rhythm: Normal rate and regular rhythm. Pulses: Normal pulses. Heart sounds: Normal heart sounds. No murmur heard. Pulmonary:      Effort: Pulmonary effort is normal.      Breath sounds: Normal breath sounds. No wheezing. Abdominal:      General: Abdomen is flat. Bowel sounds are normal.      Palpations: Abdomen is soft.    Musculoskeletal:         General: Normal range of motion. Cervical back: Normal range of motion and neck supple. No tenderness. Right lower leg: No edema. Left lower leg: No edema. Lymphadenopathy:      Cervical: No cervical adenopathy. Skin:     General: Skin is warm and dry. Findings: No rash. Neurological:      General: No focal deficit present. Mental Status: He is alert and oriented to person, place, and time. Psychiatric:         Mood and Affect: Mood normal.         Behavior: Behavior normal.         Thought Content:  Thought content normal.         Judgment: Judgment normal.       Lonnie Leone,

## 2023-11-20 NOTE — ASSESSMENT & PLAN NOTE
Continue medication regimen with Lipitor 80 mg PO QD, Metoprolol 25 mg PO Q24H, Eliquis 5 mg PO BID, and Brilinta 90 mg PO Q12H  Has not seen Cardiology since discharge/procedure  Denied any complications/symptom development since procedure    Plan:  Follow up with Cardiology referral   Continue medication regimen  Reassess at next office visit.

## 2023-11-20 NOTE — ASSESSMENT & PLAN NOTE
S/P PCI PASTORA x 3 (RCA, proximal circumflex and OM1). Patient reported that he has not had anyone from cardiology reach out to him about following up nor has he received any phone calls from cardiology / cardiac rehab for scheduling appointment  Reported being adherent to his medication regimen since discharge   Parents and family assisting patient at home  Denied any symptoms since discharge and overall mentioned being in good health again  Maintaining dietary regimen as discussed since last office visit    Plan:  Follow up with Cardiology referral   Continue medication regimen   Monitor symptoms at home and counseled on seeking immediate medical attention if symptoms return/develop again  Reassess in 1-month / next office visit.

## 2023-11-20 NOTE — ASSESSMENT & PLAN NOTE
Continue Lipitor 80 mg - denied any side effects since starting medication  Follow up repeat Lipid Panel CMP   Continue dietary modifications as discussed  Follow up with cardiology   Reassess at next office visit.

## 2023-11-27 PROBLEM — Z95.820 STATUS POST ANGIOPLASTY WITH STENT: Status: ACTIVE | Noted: 2023-04-25

## 2023-11-27 NOTE — ASSESSMENT & PLAN NOTE
S/P PCI PASTORA x 3 (RCA, proximal circumflex and OM1). No symptoms since discharge  Reports compliance with medication regimen  Has not been seen by Cardiology since hospitalization  Examination WNL     Plan:  Continue medication regimen  Follow up with Cardiology referral  Reassess at next office visit.

## 2023-12-04 DIAGNOSIS — F32.A DEPRESSION, UNSPECIFIED DEPRESSION TYPE: ICD-10-CM

## 2023-12-04 DIAGNOSIS — I21.3 STEMI (ST ELEVATION MYOCARDIAL INFARCTION) (HCC): ICD-10-CM

## 2023-12-04 RX ORDER — ATORVASTATIN CALCIUM 80 MG/1
80 TABLET, FILM COATED ORAL EVERY EVENING
Qty: 90 TABLET | Refills: 3 | Status: SHIPPED | OUTPATIENT
Start: 2023-12-04 | End: 2024-11-28

## 2023-12-04 RX ORDER — METOPROLOL SUCCINATE 25 MG/1
25 TABLET, EXTENDED RELEASE ORAL DAILY
Qty: 90 TABLET | Refills: 3 | Status: SHIPPED | OUTPATIENT
Start: 2023-12-04 | End: 2024-11-28

## 2023-12-04 RX ORDER — CITALOPRAM 20 MG/1
20 TABLET ORAL DAILY
Qty: 90 TABLET | Refills: 0 | Status: SHIPPED | OUTPATIENT
Start: 2023-12-04

## 2023-12-06 ENCOUNTER — TELEPHONE (OUTPATIENT)
Dept: FAMILY MEDICINE CLINIC | Facility: CLINIC | Age: 38
End: 2023-12-06

## 2023-12-06 DIAGNOSIS — I21.3 STEMI (ST ELEVATION MYOCARDIAL INFARCTION) (HCC): ICD-10-CM

## 2023-12-06 NOTE — TELEPHONE ENCOUNTER
Incoming call from patient requesting prescription for ticagrelor be sent to the [facility] on [address]. The original script was sent to the [facility] in June and he can no longer use that pharmacy for his medications.

## 2023-12-11 DIAGNOSIS — G47.9 SLEEP DISTURBANCE: ICD-10-CM

## 2023-12-11 RX ORDER — ZOLPIDEM TARTRATE 10 MG/1
10 TABLET ORAL
Qty: 30 TABLET | Refills: 0 | Status: SHIPPED | OUTPATIENT
Start: 2023-12-11

## 2023-12-14 DIAGNOSIS — I82.411 ACUTE DEEP VEIN THROMBOSIS (DVT) OF FEMORAL VEIN OF RIGHT LOWER EXTREMITY (HCC): ICD-10-CM

## 2024-01-11 DIAGNOSIS — G47.9 SLEEP DISTURBANCE: ICD-10-CM

## 2024-01-12 RX ORDER — ZOLPIDEM TARTRATE 10 MG/1
10 TABLET ORAL
Qty: 30 TABLET | Refills: 0 | Status: SHIPPED | OUTPATIENT
Start: 2024-01-12

## 2024-01-12 NOTE — TELEPHONE ENCOUNTER
Medication: Zolpidem / Ambien 10 mg PO QHS (#30)  PDMP Reviewed on 01/12/2024  Active agreement on file -Yes

## 2024-01-30 DIAGNOSIS — K21.9 GASTROESOPHAGEAL REFLUX DISEASE, UNSPECIFIED WHETHER ESOPHAGITIS PRESENT: ICD-10-CM

## 2024-01-30 RX ORDER — OMEPRAZOLE 20 MG/1
20 CAPSULE, DELAYED RELEASE ORAL
Qty: 90 CAPSULE | Refills: 3 | Status: SHIPPED | OUTPATIENT
Start: 2024-01-30

## 2024-02-07 DIAGNOSIS — G47.9 SLEEP DISTURBANCE: ICD-10-CM

## 2024-02-12 DIAGNOSIS — G47.9 SLEEP DISTURBANCE: ICD-10-CM

## 2024-02-12 RX ORDER — ZOLPIDEM TARTRATE 10 MG/1
10 TABLET ORAL
Qty: 30 TABLET | Refills: 0 | Status: SHIPPED | OUTPATIENT
Start: 2024-02-12

## 2024-02-13 RX ORDER — ZOLPIDEM TARTRATE 10 MG/1
10 TABLET ORAL
Qty: 30 TABLET | Refills: 0 | OUTPATIENT
Start: 2024-02-13

## 2024-03-01 DIAGNOSIS — F32.A DEPRESSION, UNSPECIFIED DEPRESSION TYPE: ICD-10-CM

## 2024-03-01 NOTE — TELEPHONE ENCOUNTER
Pt called and stated that he is out of his meds and has none left. He is requesting for them to refilled today if possible

## 2024-03-02 DIAGNOSIS — F32.A DEPRESSION, UNSPECIFIED DEPRESSION TYPE: ICD-10-CM

## 2024-03-04 DIAGNOSIS — G47.9 SLEEP DISTURBANCE: ICD-10-CM

## 2024-03-04 RX ORDER — CITALOPRAM 20 MG/1
20 TABLET ORAL DAILY
Qty: 90 TABLET | Refills: 0 | OUTPATIENT
Start: 2024-03-04

## 2024-03-04 RX ORDER — CITALOPRAM 20 MG/1
20 TABLET ORAL DAILY
Qty: 90 TABLET | Refills: 0 | Status: SHIPPED | OUTPATIENT
Start: 2024-03-04

## 2024-03-08 ENCOUNTER — TELEPHONE (OUTPATIENT)
Dept: FAMILY MEDICINE CLINIC | Facility: CLINIC | Age: 39
End: 2024-03-08

## 2024-03-11 RX ORDER — ZOLPIDEM TARTRATE 10 MG/1
10 TABLET ORAL
Qty: 30 TABLET | Refills: 0 | Status: SHIPPED | OUTPATIENT
Start: 2024-03-11

## 2024-03-11 NOTE — TELEPHONE ENCOUNTER
Medication: Zolpidem/Ambien 10 mg PO QHS   PDMP Reviewed 03/11/2024  Active agreement on file -Yes   PATIENT NAME:  Myron Mix  YOB: 1935  MRN: [id number]  SURGEON: DR. ALFORD  DATE of CONSULT:  10-22-19  DIAGNOSIS:  bilateral subdural hematomas, left greater than right    FOLLOW-UP:  Post HOSPITAL CONSULT F//U Visit: 2 weeks   Post-op Provider: NP  DIAGNOSTICS:  radiology: CT scan: HEAD, WITHOUT  (ORDERED 10-25-19)  DISPOSITION:  Pt is from [facility] in [place]. Contact is Larisa at [phone] and F: [phone].  Plan is to return there with [place]  at [phone]     ADDITIONAL INSTRUCTIONS FOR MEDICAL STAFF:    Neurology recommending Keppra at least 6 weeks

## 2024-03-19 ENCOUNTER — OFFICE VISIT (OUTPATIENT)
Dept: FAMILY MEDICINE CLINIC | Facility: CLINIC | Age: 39
End: 2024-03-19
Payer: MEDICARE

## 2024-03-19 VITALS
HEART RATE: 88 BPM | BODY MASS INDEX: 25.55 KG/M2 | OXYGEN SATURATION: 97 % | SYSTOLIC BLOOD PRESSURE: 110 MMHG | DIASTOLIC BLOOD PRESSURE: 74 MMHG | WEIGHT: 173 LBS | TEMPERATURE: 97.1 F

## 2024-03-19 DIAGNOSIS — Z00.00 ENCOUNTER FOR MEDICARE ANNUAL WELLNESS EXAM: Primary | ICD-10-CM

## 2024-03-19 DIAGNOSIS — F32.A DEPRESSION, UNSPECIFIED DEPRESSION TYPE: ICD-10-CM

## 2024-03-19 PROCEDURE — G0438 PPPS, INITIAL VISIT: HCPCS

## 2024-03-19 RX ORDER — CITALOPRAM HYDROBROMIDE 10 MG/1
30 TABLET ORAL DAILY
Qty: 90 TABLET | Refills: 0 | Status: SHIPPED | OUTPATIENT
Start: 2024-03-19

## 2024-03-19 NOTE — PROGRESS NOTES
Assessment and Plan:     Problem List Items Addressed This Visit    None      Depression Screening and Follow-up Plan: Patient was screened for depression during today's encounter. They screened negative with a PHQ-9 score of 0.    Preventive health issues were discussed with patient, and age appropriate screening tests were ordered as noted in patient's After Visit Summary.  Personalized health advice and appropriate referrals for health education or preventive services given if needed, as noted in patient's After Visit Summary.      History of Present Illness:     Patient presents for a Medicare Wellness Visit    HPI   Patient Care Team:  Amari Sheikh DO as PCP - General (Family Medicine)     Review of Systems:     Review of Systems     Problem List:     Patient Active Problem List   Diagnosis   • Intellectual disability   • BMI 27.0-27.9,adult   • Depression   • Sleep disturbance   • Gastroesophageal reflux disease   • Hyperlipemia   • Status post angioplasty with stent   • History of coronary artery stent placement   • Acute deep vein thrombosis (DVT) of femoral vein of right lower extremity (HCC)      Past Medical and Surgical History:     Past Medical History:   Diagnosis Date   • Allergic    • Anxiety    • Depression    • Diverticulitis    • Diverticulitis of colon    • GERD (gastroesophageal reflux disease)    • Insomnia      Past Surgical History:   Procedure Laterality Date   • CARDIAC CATHETERIZATION N/A 4/25/2023    Procedure: Cardiac pci;  Surgeon: Graeme Mcdermott MD;  [facility];  Service: Cardiology   • NO PAST SURGERIES        Family History:     Family History   Problem Relation Age of Onset   • Depression Mother    • Anxiety disorder Mother    • Depression Father    • Anxiety disorder Father    • Prostate cancer Father    • Cancer Father    • Cancer Paternal Grandfather       Social History:     Social History     Socioeconomic History   • Marital status: Single     Spouse name:  None   • Number of children: None   • Years of education: None   • Highest education level: None   Occupational History   • None   Tobacco Use   • Smoking status: Never   • Smokeless tobacco: Never   Vaping Use   • Vaping status: Never Used   Substance and Sexual Activity   • Alcohol use: Yes     Comment: occasionally   • Drug use: Never   • Sexual activity: Yes     Birth control/protection: Condom Male   Other Topics Concern   • None   Social History Narrative   • None     Social Determinants of Health     Financial Resource Strain: Low Risk  (3/3/2023)    Overall Financial Resource Strain (CARDIA)    • Difficulty of Paying Living Expenses: Not hard at all   Food Insecurity: No Food Insecurity (4/27/2023)    Hunger Vital Sign    • Worried About Running Out of Food in the Last Year: Never true    • Ran Out of Food in the Last Year: Never true   Transportation Needs: No Transportation Needs (4/27/2023)    PRAPARE - Transportation    • Lack of Transportation (Medical): No    • Lack of Transportation (Non-Medical): No   Physical Activity: Not on file   Stress: Not on file   Social Connections: Not on file   Intimate Partner Violence: Not on file   Housing Stability: Low Risk  (4/27/2023)    Housing Stability Vital Sign    • Unable to Pay for Housing in the Last Year: No    • Number of Places Lived in the Last Year: 1    • Unstable Housing in the Last Year: No      Medications and Allergies:     Current Outpatient Medications   Medication Sig Dispense Refill   • atorvastatin (LIPITOR) 80 mg tablet Take 1 tablet (80 mg total) by mouth every evening 90 tablet 3   • citalopram (CeleXA) 20 mg tablet TAKE 1 TABLET BY MOUTH EVERY DAY 90 tablet 0   • metoprolol succinate (TOPROL-XL) 25 mg 24 hr tablet Take 1 tablet (25 mg total) by mouth daily 90 tablet 3   • nitroglycerin (NITROSTAT) 0.4 mg SL tablet Place 1 tablet (0.4 mg total) under the tongue every 5 (five) minutes as needed for chest pain for up to 30 doses 30 tablet 5    • omeprazole (PriLOSEC) 20 mg delayed release capsule Take 1 capsule (20 mg total) by mouth daily before breakfast 90 capsule 3   • ticagrelor (BRILINTA) 90 MG Take 1 tablet (90 mg total) by mouth every 12 (twelve) hours 60 tablet 11   • zolpidem (AMBIEN) 10 mg tablet Take 1 tablet (10 mg total) by mouth daily at bedtime as needed for sleep 30 tablet 0   • apixaban (Eliquis) 5 mg Take 1 tablet (5 mg total) by mouth 2 (two) times a day 180 tablet 0     No current facility-administered medications for this visit.     No Known Allergies   Immunizations:     Immunization History   Administered Date(s) Administered   • COVID-19 MODERNA VACC 0.5 ML IM 05/26/2021   • H1N1, All Formulations 01/11/2010   • INFLUENZA 10/21/2014, 10/07/2015, 10/03/2016, 10/05/2017, 10/11/2018, 09/29/2020   • Influenza, injectable, quadrivalent, preservative free 0.5 mL 12/03/2021, 11/17/2023   • Tdap 10/05/2017      Health Maintenance:         Topic Date Due   • Hepatitis C Screening  Never done   • HIV Screening  Completed         Topic Date Due   • Pneumococcal Vaccine: Pediatrics (0 to 5 Years) and At-Risk Patients (6 to 64 Years) (1 of 2 - PCV) Never done   • COVID-19 Vaccine (2 - 2023-24 season) 09/01/2023      Medicare Screening Tests and Risk Assessments:     Rajat is here for his Subsequent Wellness visit.     Health Risk Assessment:   Patient rates overall health as very good. Patient feels that their physical health rating is same. Patient is satisfied with their life. Hearing was rated as same. Patient feels that their emotional and mental health rating is same. Patients states they are never, rarely angry. Patient states they are always unusually tired/fatigued. Pain experienced in the last 7 days has been none. Patient states that he has experienced no weight loss or gain in last 6 months.     Depression Screening:   PHQ-9 Score: 0      Fall Risk Screening:   In the past year, patient has experienced: no history of falling in  past year      Home Safety:  Patient does not have trouble with stairs inside or outside of their home. Patient has no working smoke alarms Home safety hazards include: none.     Nutrition:   Current diet is Regular.     Medications:   Patient is not currently taking any over-the-counter supplements. Patient is able to manage medications.     Activities of Daily Living (ADLs)/Instrumental Activities of Daily Living (IADLs):   Walk and transfer into and out of bed and chair?: Yes  Dress and groom yourself?: Yes    Bathe or shower yourself?: Yes    Feed yourself? Yes  Do your laundry/housekeeping?: Yes  Manage your money, pay your bills and track your expenses?: Yes  Make your own meals?: Yes    Do your own shopping?: Yes    Previous Hospitalizations:   Any hospitalizations or ED visits within the last 12 months?: No      PREVENTIVE SCREENINGS      Cardiovascular Screening:    General: Screening Not Indicated and History Lipid Disorder      Diabetes Screening:     General: Screening Current      Prostate Cancer Screening:    General: Screening Not Indicated      Lung Cancer Screening:     General: Screening Not Indicated    Screening, Brief Intervention, and Referral to Treatment (SBIRT)    Screening  Typical number of drinks in a day: 0  Typical number of drinks in a week: 0  Interpretation: Low risk drinking behavior.    No results found.     Physical Exam:     /74 (BP Location: Left arm, Patient Position: Sitting, Cuff Size: Standard)   Pulse 88   Temp (!) 97.1 °F (36.2 °C) (Tympanic)   Wt 78.5 kg (173 lb)   SpO2 97%   BMI 25.55 kg/m²     Physical Exam     Amari Sheikh DO

## 2024-04-11 DIAGNOSIS — G47.9 SLEEP DISTURBANCE: ICD-10-CM

## 2024-04-12 RX ORDER — ZOLPIDEM TARTRATE 10 MG/1
10 TABLET ORAL
Qty: 30 TABLET | Refills: 0 | Status: SHIPPED | OUTPATIENT
Start: 2024-04-12

## 2024-04-12 NOTE — TELEPHONE ENCOUNTER
Medication: Zolpidem / AMBIEN 10 mg PO QHS PRN for sleep (#30)  PDMP Reviewed 04/12/2024  Active agreement on file -Yes

## 2024-04-24 ENCOUNTER — TELEPHONE (OUTPATIENT)
Dept: FAMILY MEDICINE CLINIC | Facility: CLINIC | Age: 39
End: 2024-04-24

## 2024-04-24 NOTE — TELEPHONE ENCOUNTER
Patient called to state his Ambien is not working wanted to switch to something else. Warm transfer to [facility].

## 2024-04-24 NOTE — TELEPHONE ENCOUNTER
Patient states the ambien is not working for him and would like something else. He states he can only sleep until about 1-2 am and then he awake all night

## 2024-04-26 ENCOUNTER — TELEPHONE (OUTPATIENT)
Age: 39
End: 2024-04-26

## 2024-04-26 NOTE — TELEPHONE ENCOUNTER
Patient calling again to check on prescription instead of Ambien.  Please review.  I told him that the Doctor is reviewing ..    [facility]/pharmacy #[id number] - [place] - [address] [facility]

## 2024-04-29 NOTE — ASSESSMENT & PLAN NOTE
Grief and mild depression  Patient is grieving the loss of his mother and is concerned about his father's health.   Currently taking citalopram 20mg for mild depression.    Plan:   - Increase citalopram to 30mg daily (add 10mg to the current 20mg dose)  - Reassess in three months, and if needed, consider increasing to 40mg or adding adjunctive therapy.

## 2024-04-29 NOTE — PROGRESS NOTES
Assessment and Plan:     Problem List Items Addressed This Visit          Behavioral Health    Depression (Chronic)     Grief and mild depression  Patient is grieving the loss of his mother and is concerned about his father's health.   Currently taking citalopram 20mg for mild depression.    Plan:   - Increase citalopram to 30mg daily (add 10mg to the current 20mg dose)  - Reassess in three months, and if needed, consider increasing to 40mg or adding adjunctive therapy.         Relevant Medications    citalopram (CeleXA) 10 mg tablet     Other Visit Diagnoses       Annual physical exam    -  Primary            Depression Screening and Follow-up Plan: Patient was screened for depression during today's encounter. They screened negative with a PHQ-9 score of 0.      Preventive health issues were discussed with patient, and age appropriate screening tests were ordered as noted in patient's After Visit Summary.  Personalized health advice and appropriate referrals for health education or preventive services given if needed, as noted in patient's After Visit Summary.      History of Present Illness:     Patient presents for a Medicare Wellness Visit    HPI   Patient Care Team:  Amari Sheikh DO as PCP - General (Family Medicine)     Review of Systems:     Review of Systems     Problem List:     Patient Active Problem List   Diagnosis    Intellectual disability    BMI 27.0-27.9,adult    Depression    Sleep disturbance    Gastroesophageal reflux disease    Hyperlipemia    Status post angioplasty with stent    History of coronary artery stent placement    Acute deep vein thrombosis (DVT) of femoral vein of right lower extremity (HCC)      Past Medical and Surgical History:     Past Medical History:   Diagnosis Date    Allergic     Anxiety     Depression     Diverticulitis     Diverticulitis of colon     GERD (gastroesophageal reflux disease)     Insomnia      Past Surgical History:   Procedure Laterality Date    CARDIAC  CATHETERIZATION N/A 4/25/2023    Procedure: Cardiac pci;  Surgeon: Graeme Mcdermott MD;  [facility];  Service: Cardiology    NO PAST SURGERIES        Family History:     Family History   Problem Relation Age of Onset    Depression Mother     Anxiety disorder Mother     Depression Father     Anxiety disorder Father     Prostate cancer Father     Cancer Father     Cancer Paternal Grandfather       Social History:     Social History     Socioeconomic History    Marital status: Single     Spouse name: None    Number of children: None    Years of education: None    Highest education level: None   Occupational History    None   Tobacco Use    Smoking status: Never    Smokeless tobacco: Never   Vaping Use    Vaping status: Never Used   Substance and Sexual Activity    Alcohol use: Yes     Comment: occasionally    Drug use: Never    Sexual activity: Yes     Birth control/protection: Condom Male   Other Topics Concern    None   Social History Narrative    None     Social Determinants of Health     Financial Resource Strain: Low Risk  (3/3/2023)    Overall Financial Resource Strain (CARDIA)     Difficulty of Paying Living Expenses: Not hard at all   Food Insecurity: No Food Insecurity (3/19/2024)    Hunger Vital Sign     Worried About Running Out of Food in the Last Year: Never true     Ran Out of Food in the Last Year: Never true   Transportation Needs: No Transportation Needs (3/19/2024)    PRAPARE - Transportation     Lack of Transportation (Medical): No     Lack of Transportation (Non-Medical): No   Physical Activity: Not on file   Stress: Not on file   Social Connections: Not on file   Intimate Partner Violence: Not on file   Housing Stability: Unknown (3/19/2024)    Housing Stability Vital Sign     Unable to Pay for Housing in the Last Year: No     Number of Places Lived in the Last Year: Not on file     Unstable Housing in the Last Year: No      Medications and Allergies:     Current Outpatient  Medications   Medication Sig Dispense Refill    atorvastatin (LIPITOR) 80 mg tablet Take 1 tablet (80 mg total) by mouth every evening 90 tablet 3    citalopram (CeleXA) 10 mg tablet Take 3 tablets (30 mg total) by mouth daily 90 tablet 0    metoprolol succinate (TOPROL-XL) 25 mg 24 hr tablet Take 1 tablet (25 mg total) by mouth daily 90 tablet 3    nitroglycerin (NITROSTAT) 0.4 mg SL tablet Place 1 tablet (0.4 mg total) under the tongue every 5 (five) minutes as needed for chest pain for up to 30 doses 30 tablet 5    omeprazole (PriLOSEC) 20 mg delayed release capsule Take 1 capsule (20 mg total) by mouth daily before breakfast 90 capsule 3    ticagrelor (BRILINTA) 90 MG Take 1 tablet (90 mg total) by mouth every 12 (twelve) hours 60 tablet 11    apixaban (Eliquis) 5 mg Take 1 tablet (5 mg total) by mouth 2 (two) times a day 180 tablet 0    zolpidem (AMBIEN) 10 mg tablet Take 1 tablet (10 mg total) by mouth daily at bedtime as needed for sleep 30 tablet 0     No current facility-administered medications for this visit.     No Known Allergies   Immunizations:     Immunization History   Administered Date(s) Administered    COVID-19 MODERNA VACC 0.5 ML IM 05/26/2021    H1N1, All Formulations 01/11/2010    INFLUENZA 10/21/2014, 10/07/2015, 10/03/2016, 10/05/2017, 10/11/2018, 09/29/2020    Influenza, injectable, quadrivalent, preservative free 0.5 mL 12/03/2021, 11/17/2023    Tdap 10/05/2017      Health Maintenance:         Topic Date Due    Hepatitis C Screening  Never done    HIV Screening  Completed         Topic Date Due    Pneumococcal Vaccine: Pediatrics (0 to 5 Years) and At-Risk Patients (6 to 64 Years) (1 of 2 - PCV) Never done    COVID-19 Vaccine (2 - 2023-24 season) 09/01/2023      Medicare Screening Tests and Risk Assessments:     Rajat is here for his Subsequent Wellness visit.     Health Risk Assessment:   Patient rates overall health as very good. Patient feels that their physical health rating is same.  Patient is satisfied with their life. Hearing was rated as same. Patient feels that their emotional and mental health rating is same. Patients states they are never, rarely angry. Patient states they are always unusually tired/fatigued. Pain experienced in the last 7 days has been none. Patient states that he has experienced no weight loss or gain in last 6 months.     Depression Screening:   PHQ-9 Score: 0      Fall Risk Screening:   In the past year, patient has experienced: no history of falling in past year      Home Safety:  Patient does not have trouble with stairs inside or outside of their home. Patient has no working smoke alarms Home safety hazards include: none.     Nutrition:   Current diet is Regular.     Medications:   Patient is not currently taking any over-the-counter supplements. Patient is able to manage medications.     Activities of Daily Living (ADLs)/Instrumental Activities of Daily Living (IADLs):   Walk and transfer into and out of bed and chair?: Yes  Dress and groom yourself?: Yes    Bathe or shower yourself?: Yes    Feed yourself? Yes  Do your laundry/housekeeping?: Yes  Manage your money, pay your bills and track your expenses?: Yes  Make your own meals?: Yes    Do your own shopping?: Yes    Previous Hospitalizations:   Any hospitalizations or ED visits within the last 12 months?: No      PREVENTIVE SCREENINGS      Cardiovascular Screening:    General: Screening Not Indicated and History Lipid Disorder      Diabetes Screening:     General: Screening Current      Prostate Cancer Screening:    General: Screening Not Indicated      Lung Cancer Screening:     General: Screening Not Indicated    Screening, Brief Intervention, and Referral to Treatment (SBIRT)    Screening  Typical number of drinks in a day: 0  Typical number of drinks in a week: 0  Interpretation: Low risk drinking behavior.    No results found.     Physical Exam:     /74 (BP Location: Left arm, Patient Position:  Sitting, Cuff Size: Standard)   Pulse 88   Temp (!) 97.1 °F (36.2 °C) (Tympanic)   Wt 78.5 kg (173 lb)   SpO2 97%   BMI 25.55 kg/m²     Physical Exam  Vitals reviewed.   Constitutional:       General: He is not in acute distress.     Appearance: Normal appearance. He is not ill-appearing, toxic-appearing or diaphoretic.   HENT:      Head: Normocephalic and atraumatic.      Right Ear: Tympanic membrane, ear canal and external ear normal.      Left Ear: Tympanic membrane, ear canal and external ear normal.      Nose: Nose normal. No congestion or rhinorrhea.      Mouth/Throat:      Mouth: Mucous membranes are moist.      Pharynx: Oropharynx is clear. No oropharyngeal exudate or posterior oropharyngeal erythema.   Eyes:      General: No scleral icterus.        Right eye: No discharge.         Left eye: No discharge.      Extraocular Movements: Extraocular movements intact.      Conjunctiva/sclera: Conjunctivae normal.      Pupils: Pupils are equal, round, and reactive to light.   Neck:      Vascular: No carotid bruit.   Cardiovascular:      Rate and Rhythm: Normal rate and regular rhythm.      Pulses: Normal pulses.      Heart sounds: Normal heart sounds. No murmur heard.     No friction rub. No gallop.   Pulmonary:      Effort: Pulmonary effort is normal.      Breath sounds: Normal breath sounds. No wheezing.   Abdominal:      General: Abdomen is flat. Bowel sounds are normal.      Palpations: Abdomen is soft.      Tenderness: There is no right CVA tenderness or left CVA tenderness.   Musculoskeletal:         General: Normal range of motion.      Cervical back: Normal range of motion and neck supple. No rigidity or tenderness.      Right lower leg: No edema.      Left lower leg: No edema.   Lymphadenopathy:      Cervical: No cervical adenopathy.   Skin:     General: Skin is warm.      Capillary Refill: Capillary refill takes less than 2 seconds.      Findings: No rash.   Neurological:      General: No focal  deficit present.      Mental Status: He is alert.      Cranial Nerves: No cranial nerve deficit.      Motor: No weakness.      Gait: Gait normal.   Psychiatric:         Attention and Perception: Attention and perception normal.         Mood and Affect: Mood and affect normal.         Speech: Speech normal.         Behavior: Behavior normal. Behavior is cooperative.         Thought Content: Thought content normal. Thought content is not paranoid or delusional. Thought content does not include homicidal or suicidal ideation. Thought content does not include homicidal or suicidal plan.          Amari Sheikh, DO

## 2024-04-30 ENCOUNTER — OFFICE VISIT (OUTPATIENT)
Dept: FAMILY MEDICINE CLINIC | Facility: CLINIC | Age: 39
End: 2024-04-30
Payer: MEDICARE

## 2024-04-30 VITALS
HEIGHT: 69 IN | SYSTOLIC BLOOD PRESSURE: 108 MMHG | OXYGEN SATURATION: 98 % | TEMPERATURE: 98.3 F | HEART RATE: 75 BPM | BODY MASS INDEX: 24.29 KG/M2 | DIASTOLIC BLOOD PRESSURE: 68 MMHG | WEIGHT: 164 LBS

## 2024-04-30 DIAGNOSIS — Z95.5 HISTORY OF CORONARY ARTERY STENT PLACEMENT: Primary | ICD-10-CM

## 2024-04-30 DIAGNOSIS — G47.9 SLEEP DISTURBANCE: ICD-10-CM

## 2024-04-30 PROCEDURE — 99213 OFFICE O/P EST LOW 20 MIN: CPT

## 2024-04-30 PROCEDURE — G2211 COMPLEX E/M VISIT ADD ON: HCPCS

## 2024-04-30 RX ORDER — HYDROXYZINE HYDROCHLORIDE 25 MG/1
25 TABLET, FILM COATED ORAL
Qty: 30 TABLET | Refills: 0 | Status: SHIPPED | OUTPATIENT
Start: 2024-04-30

## 2024-04-30 NOTE — TELEPHONE ENCOUNTER
Patient calling again to check on prescription instead of Ambien.  Please review.  I told him that the Doctor is reviewing ..    [facility]/pharmacy #[id number] - [place] - [id number]

## 2024-04-30 NOTE — PROGRESS NOTES
Name: Rajat Winn      : 1985      MRN: [id number]  Encounter Provider: Amari Sheikh DO  Encounter Date: 2024   Encounter department: [facility]    Assessment & Plan     1. History of coronary artery stent placement  -     Ambulatory Referral to Cardiology; Future    2. Sleep disturbance  Assessment & Plan:  Current regimen with Ambien 10 mg PO QHS and Melatonin not adequately treating sleep problems for the patient.   Will attempt hydroxyzine prior to Ambien nightly to assess if the patient experiences any sleep changes.   If current plan fails will decide with patient on tapering off Ambien and trying another agent.   Education provided on sleep hygiene.   Reassess at next office visit.     Orders:  -     hydrOXYzine HCL (ATARAX) 25 mg tablet; Take 1 tablet (25 mg total) by mouth daily at bedtime             Emotional and Mental Well-being, Sleep, Connectedness Assessment and Intervention:    Sleep/stress assessment performed      Therapeutic Lifestyle Change Visit:     One-on-one comprehensive counseling, coaching, and health behavior change visit completed      Subjective     38-year-old male patient with long-standing history of insomnia currently managed with Ambien 10 mg presents to the office for follow up.  Reports experiencing insomnia again as he says Ambien no longer working.  Reports being on current medication and dose for over 10-years.  Says he has tried a different sleeping pill in the past,but cannot recall all the different medications.  Says currently attempted using Melatonin as well but this too has been ineffective.  Reports current sleep routine involves going to bed at 8:30 p.m., taking Ambien at 8 p.m., waking up at 1 a.m., attempting to go back to sleep - sometimes will be able to and most times he says can't fall back asleep.  Denies any changes to his sleep routine nor any changes to his dietary/physical activity pattern.  Denies any other  symptoms today.       Review of Systems   Constitutional:  Negative for activity change.   Neurological:  Negative for headaches.   Psychiatric/Behavioral:  Negative for dysphoric mood and hallucinations.         Reports sleeping problems per HPI       Past Medical History:   Diagnosis Date    Allergic     Anxiety     Depression     Diverticulitis     Diverticulitis of colon     GERD (gastroesophageal reflux disease)     Insomnia      Past Surgical History:   Procedure Laterality Date    CARDIAC CATHETERIZATION N/A 4/25/2023    Procedure: Cardiac pci;  Surgeon: Graeme Mcdermott MD;  [facility];  Service: Cardiology    NO PAST SURGERIES       Family History   Problem Relation Age of Onset    Depression Mother     Anxiety disorder Mother     Depression Father     Anxiety disorder Father     Prostate cancer Father     Cancer Father     Cancer Paternal Grandfather      Social History     Socioeconomic History    Marital status: Single     Spouse name: None    Number of children: None    Years of education: None    Highest education level: None   Occupational History    None   Tobacco Use    Smoking status: Never    Smokeless tobacco: Never   Vaping Use    Vaping status: Never Used   Substance and Sexual Activity    Alcohol use: Yes     Comment: occasionally    Drug use: Never    Sexual activity: Yes     Birth control/protection: Condom Male   Other Topics Concern    None   Social History Narrative    None     Social Determinants of Health     Financial Resource Strain: Low Risk  (3/3/2023)    Overall Financial Resource Strain (CARDIA)     Difficulty of Paying Living Expenses: Not hard at all   Food Insecurity: No Food Insecurity (3/19/2024)    Hunger Vital Sign     Worried About Running Out of Food in the Last Year: Never true     Ran Out of Food in the Last Year: Never true   Transportation Needs: No Transportation Needs (3/19/2024)    PRAPARE - Transportation     Lack of Transportation (Medical): No      "Lack of Transportation (Non-Medical): No   Physical Activity: Not on file   Stress: Not on file   Social Connections: Not on file   Intimate Partner Violence: Not on file   Housing Stability: Unknown (3/19/2024)    Housing Stability Vital Sign     Unable to Pay for Housing in the Last Year: No     Number of Places Lived in the Last Year: Not on file     Unstable Housing in the Last Year: No     Current Outpatient Medications on File Prior to Visit   Medication Sig    atorvastatin (LIPITOR) 80 mg tablet Take 1 tablet (80 mg total) by mouth every evening    citalopram (CeleXA) 10 mg tablet Take 3 tablets (30 mg total) by mouth daily    metoprolol succinate (TOPROL-XL) 25 mg 24 hr tablet Take 1 tablet (25 mg total) by mouth daily    nitroglycerin (NITROSTAT) 0.4 mg SL tablet Place 1 tablet (0.4 mg total) under the tongue every 5 (five) minutes as needed for chest pain for up to 30 doses    omeprazole (PriLOSEC) 20 mg delayed release capsule Take 1 capsule (20 mg total) by mouth daily before breakfast    ticagrelor (BRILINTA) 90 MG Take 1 tablet (90 mg total) by mouth every 12 (twelve) hours    zolpidem (AMBIEN) 10 mg tablet Take 1 tablet (10 mg total) by mouth daily at bedtime as needed for sleep    apixaban (Eliquis) 5 mg Take 1 tablet (5 mg total) by mouth 2 (two) times a day     No Known Allergies  Immunization History   Administered Date(s) Administered    COVID-19 MODERNA VACC 0.5 ML IM 05/26/2021    H1N1, All Formulations 01/11/2010    INFLUENZA 10/21/2014, 10/07/2015, 10/03/2016, 10/05/2017, 10/11/2018, 09/29/2020    Influenza, injectable, quadrivalent, preservative free 0.5 mL 12/03/2021, 11/17/2023    Tdap 10/05/2017       Objective     /68 (BP Location: Left arm, Patient Position: Sitting, Cuff Size: Standard)   Pulse 75   Temp 98.3 °F (36.8 °C) (Tympanic)   Ht 5' 9\" (1.753 m)   Wt 74.4 kg (164 lb)   SpO2 98%   BMI 24.22 kg/m²     Physical Exam  Constitutional:       Appearance: Normal appearance. " He is normal weight.   HENT:      Head: Normocephalic and atraumatic.      Right Ear: Tympanic membrane, ear canal and external ear normal.      Left Ear: Tympanic membrane, ear canal and external ear normal.      Nose: Nose normal.      Mouth/Throat:      Mouth: Mucous membranes are moist.      Pharynx: Oropharynx is clear.   Eyes:      Extraocular Movements: Extraocular movements intact.      Conjunctiva/sclera: Conjunctivae normal.      Pupils: Pupils are equal, round, and reactive to light.   Neck:      Vascular: No carotid bruit.   Cardiovascular:      Rate and Rhythm: Normal rate and regular rhythm.      Pulses: Normal pulses.      Heart sounds: Normal heart sounds. No murmur heard.  Pulmonary:      Effort: Pulmonary effort is normal.      Breath sounds: Normal breath sounds. No wheezing.   Abdominal:      General: Abdomen is flat. Bowel sounds are normal.      Palpations: Abdomen is soft.   Musculoskeletal:         General: Normal range of motion.      Cervical back: Normal range of motion and neck supple. No tenderness.      Right lower leg: No edema.      Left lower leg: No edema.   Lymphadenopathy:      Cervical: No cervical adenopathy.   Skin:     General: Skin is warm and dry.      Findings: No rash.   Neurological:      General: No focal deficit present.      Mental Status: He is alert and oriented to person, place, and time.   Psychiatric:         Mood and Affect: Mood normal.         Behavior: Behavior normal.         Thought Content: Thought content normal.         Judgment: Judgment normal.       Amari Sheikh,

## 2024-05-08 DIAGNOSIS — G47.9 SLEEP DISTURBANCE: ICD-10-CM

## 2024-05-08 RX ORDER — ZOLPIDEM TARTRATE 10 MG/1
10 TABLET ORAL
Qty: 30 TABLET | Refills: 0 | Status: SHIPPED | OUTPATIENT
Start: 2024-05-08

## 2024-05-08 NOTE — TELEPHONE ENCOUNTER
Medication:   zolpidem (AMBIEn    Dose/Frequency: Take 1 tablet (10 mg total) by mouth daily at bedtime as needed for sleep    Quantity: 30    Pharmacy: [facility]/pharmacy #[id number] - [place] - [address]     Office:   [x] PCP/Provider -   [] Speciality/Provider -     Does the patient have enough for 3 days?   [] Yes   [x] No - Send as HP to POD

## 2024-05-08 NOTE — ASSESSMENT & PLAN NOTE
Current regimen with Ambien 10 mg PO QHS and Melatonin not adequately treating sleep problems for the patient.   Will attempt hydroxyzine prior to Ambien nightly to assess if the patient experiences any sleep changes.   If current plan fails will decide with patient on tapering off Ambien and trying another agent.   Education provided on sleep hygiene.   Reassess at next office visit.

## 2024-05-24 DIAGNOSIS — G47.9 SLEEP DISTURBANCE: ICD-10-CM

## 2024-05-24 RX ORDER — HYDROXYZINE HYDROCHLORIDE 25 MG/1
25 TABLET, FILM COATED ORAL
Qty: 30 TABLET | Refills: 0 | Status: SHIPPED | OUTPATIENT
Start: 2024-05-24

## 2024-05-29 ENCOUNTER — RA CDI HCC (OUTPATIENT)
Dept: OTHER | Facility: HOSPITAL | Age: 39
End: 2024-05-29

## 2024-06-03 DIAGNOSIS — F32.A DEPRESSION, UNSPECIFIED DEPRESSION TYPE: ICD-10-CM

## 2024-06-03 DIAGNOSIS — K21.9 GASTROESOPHAGEAL REFLUX DISEASE, UNSPECIFIED WHETHER ESOPHAGITIS PRESENT: ICD-10-CM

## 2024-06-03 DIAGNOSIS — G47.9 SLEEP DISTURBANCE: ICD-10-CM

## 2024-06-03 RX ORDER — CITALOPRAM HYDROBROMIDE 10 MG/1
30 TABLET ORAL DAILY
Qty: 90 TABLET | Refills: 5 | Status: SHIPPED | OUTPATIENT
Start: 2024-06-03

## 2024-06-03 RX ORDER — OMEPRAZOLE 20 MG/1
20 CAPSULE, DELAYED RELEASE ORAL
Qty: 90 CAPSULE | Refills: 1 | Status: SHIPPED | OUTPATIENT
Start: 2024-06-03

## 2024-06-05 DIAGNOSIS — G47.9 SLEEP DISTURBANCE: ICD-10-CM

## 2024-06-06 RX ORDER — HYDROXYZINE HYDROCHLORIDE 25 MG/1
25 TABLET, FILM COATED ORAL
Qty: 90 TABLET | Refills: 1 | Status: SHIPPED | OUTPATIENT
Start: 2024-06-06

## 2024-06-06 RX ORDER — ZOLPIDEM TARTRATE 10 MG/1
10 TABLET ORAL
Qty: 30 TABLET | Refills: 0 | Status: SHIPPED | OUTPATIENT
Start: 2024-06-06

## 2024-06-28 DIAGNOSIS — F32.A DEPRESSION, UNSPECIFIED DEPRESSION TYPE: ICD-10-CM

## 2024-06-28 RX ORDER — CITALOPRAM HYDROBROMIDE 10 MG/1
30 TABLET ORAL DAILY
Qty: 90 TABLET | Refills: 5 | Status: SHIPPED | OUTPATIENT
Start: 2024-06-28

## 2024-07-02 DIAGNOSIS — G47.9 SLEEP DISTURBANCE: ICD-10-CM

## 2024-07-02 RX ORDER — ZOLPIDEM TARTRATE 10 MG/1
10 TABLET ORAL
Qty: 30 TABLET | Refills: 0 | Status: SHIPPED | OUTPATIENT
Start: 2024-07-02

## 2024-07-02 NOTE — TELEPHONE ENCOUNTER
Medication: zolpidem (AMBIEN) 10 mg tablet     Dose/Frequency:   Take 1 tablet (10 mg total) by mouth daily at bedtime as needed for sleep        Quantity: 30    Pharmacy: [facility]/pharmacy #[id number] - [place] - [address]     Office:   [x] PCP/Provider -   [] Speciality/Provider -     Does the patient have enough for 3 days?   [] Yes   [x] No - Send as HP to POD

## 2024-07-22 ENCOUNTER — TELEPHONE (OUTPATIENT)
Age: 39
End: 2024-07-22

## 2024-07-22 NOTE — TELEPHONE ENCOUNTER
I scheduled patient for this Friday to transfer to a new provider in a new patient slot. I was unable to change it to transfer appointment or an ovl, whichever the office prefers. Please change the appointment type.     Thank you.

## 2024-07-29 ENCOUNTER — OFFICE VISIT (OUTPATIENT)
Dept: FAMILY MEDICINE CLINIC | Facility: CLINIC | Age: 39
End: 2024-07-29
Payer: MEDICARE

## 2024-07-29 VITALS
DIASTOLIC BLOOD PRESSURE: 87 MMHG | SYSTOLIC BLOOD PRESSURE: 143 MMHG | TEMPERATURE: 97.9 F | HEART RATE: 105 BPM | BODY MASS INDEX: 26.96 KG/M2 | HEIGHT: 69 IN | OXYGEN SATURATION: 97 % | WEIGHT: 182 LBS

## 2024-07-29 DIAGNOSIS — F32.A DEPRESSION, UNSPECIFIED DEPRESSION TYPE: Chronic | ICD-10-CM

## 2024-07-29 DIAGNOSIS — G47.9 SLEEP DISTURBANCE: Primary | Chronic | ICD-10-CM

## 2024-07-29 PROBLEM — Z63.4 BEREAVEMENT: Status: ACTIVE | Noted: 2024-07-29

## 2024-07-29 PROCEDURE — G2211 COMPLEX E/M VISIT ADD ON: HCPCS

## 2024-07-29 PROCEDURE — 99213 OFFICE O/P EST LOW 20 MIN: CPT

## 2024-07-29 RX ORDER — ZOLPIDEM TARTRATE 10 MG/1
10 TABLET ORAL
Qty: 30 TABLET | Refills: 0 | Status: SHIPPED | OUTPATIENT
Start: 2024-08-02

## 2024-07-29 NOTE — PROGRESS NOTES
Ambulatory Visit  Name: Rajat Winn      : 1985      MRN: [id number]  Encounter Provider: Roseanne Parra MD  Encounter Date: 2024   Encounter department: [facility]    Assessment & Plan   1. Sleep disturbance  Assessment & Plan:  Since trialed on Atarax, however, patient self discontinued due to drowsiness the next morning  Has been on Ambien for over 10 years; tolerating well  Noted rapid and pressured speech on today's exam, however, patient reports he has been told he talks fast all of his life  Consider other psychiatric etiology causing insomnia  No auditory or visual hallucinations, no SI, no HI    Counseled on sleep hygiene  Continue Ambien; consider tapering patient off Ambien as tolerated in the future  Will continue to monitor; return to clinic in 2-4 weeks  Orders:  -     Ambulatory referral to Psych Services; Future  -     zolpidem (AMBIEN) 10 mg tablet; Take 1 tablet (10 mg total) by mouth daily at bedtime as needed for sleep Do not start before 2024.  2. Depression, unspecified depression type  Assessment & Plan:  PHQ 0 today, however, TWYLA positive 11  Currently experiencing bereavement after loss of his mother in February  Increased anxiety and sadness since then      Continue Celexa 30 mg daily  Psych referral for talk therapy placed  Discussed grief stages with patient; recommended journaling, meditating, talking with family members       History of Present Illness     Pat is a 38-year-old male with past medical history of sleep disturbance, hyperlipidemia, CAD status post stent, depression presenting to the office inquiring about therapy referral.  Patient reports that his mom passed away in February due to bone cancer and has been grieving and would like to talk to someone.  Childhood memories have been resurfacing since then and patient is finding himself crying consistently.  Patient emphasizes that set memories are positive,  "however, patient believes she would benefit tremendously by talking to someone as he has a lot on his mind.     In regards to patient's sleeping habits, patient was trialed on Atarax.  Patient states that he discontinued Atarax as he felt extremely tired the next morning.  He is taking his Ambien directed and has not had any issues with sleeping.  Patient denies visual or auditory hallucinations, SI, HI; reports nervousness and anxiety.  No other concerns today.          Review of Systems   Constitutional:  Negative for fatigue.   Psychiatric/Behavioral:  Negative for behavioral problems, decreased concentration, dysphoric mood, hallucinations, self-injury, sleep disturbance and suicidal ideas. The patient is nervous/anxious.        Objective     /87 (BP Location: Left arm, Patient Position: Sitting, Cuff Size: Standard)   Pulse 105   Temp 97.9 °F (36.6 °C) (Tympanic)   Ht 5' 9\" (1.753 m)   Wt 82.6 kg (182 lb)   SpO2 97%   BMI 26.88 kg/m²     Physical Exam  Constitutional:       Appearance: Normal appearance.   HENT:      Head: Normocephalic and atraumatic.   Cardiovascular:      Rate and Rhythm: Normal rate and regular rhythm.      Pulses: Normal pulses.      Heart sounds: Normal heart sounds.   Pulmonary:      Effort: Pulmonary effort is normal.   Neurological:      Mental Status: He is alert.   Psychiatric:         Attention and Perception: Attention and perception normal.         Mood and Affect: Affect normal. Mood is anxious.         Behavior: Behavior normal. Behavior is cooperative.         Thought Content: Thought content normal. Thought content is not paranoid or delusional. Thought content does not include homicidal or suicidal ideation. Thought content does not include homicidal or suicidal plan.         Cognition and Memory: Cognition and memory normal.         Judgment: Judgment normal.       Administrative Statements           "

## 2024-07-30 ENCOUNTER — TELEPHONE (OUTPATIENT)
Age: 39
End: 2024-07-30

## 2024-07-30 NOTE — ASSESSMENT & PLAN NOTE
Since trialed on Atarax, however, patient self discontinued due to drowsiness the next morning  Has been on Ambien for over 10 years; tolerating well  Noted rapid and pressured speech on today's exam, however, patient reports he has been told he talks fast all of his life  Consider other psychiatric etiology causing insomnia  No auditory or visual hallucinations, no SI, no HI    Counseled on sleep hygiene  Continue Ambien; consider tapering patient off Ambien as tolerated in the future  Will continue to monitor; return to clinic in 2-4 weeks

## 2024-07-30 NOTE — ASSESSMENT & PLAN NOTE
PHQ 0 today, however, TWYLA positive 11  Currently experiencing bereavement after loss of his mother in February  Increased anxiety and sadness since then      Continue Celexa 30 mg daily  Psych referral for talk therapy placed  Discussed grief stages with patient; recommended journaling, meditating, talking with family members

## 2024-07-31 DIAGNOSIS — F32.A DEPRESSION, UNSPECIFIED DEPRESSION TYPE: ICD-10-CM

## 2024-07-31 RX ORDER — CITALOPRAM HYDROBROMIDE 10 MG/1
30 TABLET ORAL DAILY
Qty: 270 TABLET | Refills: 1 | Status: SHIPPED | OUTPATIENT
Start: 2024-07-31

## 2024-08-05 DIAGNOSIS — G47.9 SLEEP DISTURBANCE: Chronic | ICD-10-CM

## 2024-08-06 RX ORDER — ZOLPIDEM TARTRATE 10 MG/1
10 TABLET ORAL
Qty: 30 TABLET | Refills: 0 | OUTPATIENT
Start: 2024-08-06

## 2024-08-29 DIAGNOSIS — F32.A DEPRESSION, UNSPECIFIED DEPRESSION TYPE: ICD-10-CM

## 2024-08-29 DIAGNOSIS — G47.9 SLEEP DISTURBANCE: Chronic | ICD-10-CM

## 2024-08-29 DIAGNOSIS — K21.9 GASTROESOPHAGEAL REFLUX DISEASE, UNSPECIFIED WHETHER ESOPHAGITIS PRESENT: ICD-10-CM

## 2024-08-29 RX ORDER — ZOLPIDEM TARTRATE 10 MG/1
10 TABLET ORAL
Qty: 30 TABLET | Refills: 0 | Status: SHIPPED | OUTPATIENT
Start: 2024-09-04

## 2024-08-29 RX ORDER — CITALOPRAM HYDROBROMIDE 10 MG/1
30 TABLET ORAL DAILY
Qty: 270 TABLET | Refills: 1 | Status: SHIPPED | OUTPATIENT
Start: 2024-08-29

## 2024-08-29 NOTE — TELEPHONE ENCOUNTER
Refilled ambien, reviewed PDMP, agree with Dr. Orozco's plan to have patient schedule follow up to establish care with him and discuss weaning of ambien.

## 2024-08-29 NOTE — TELEPHONE ENCOUNTER
Medication: citalopram (CeleXA) 10 mg tablet    Dose/Frequency: TAKE 3 TABLETS BY MOUTH EVERY DAY    Quantity: Dispense: 270 tablet     Office:   [x] PCP/Provider - Keanu Licona MD   [] Speciality/Provider -     Does the patient have enough for 3 days?   [] Yes   [x] No - Send as HP to POD    Medication: omeprazole (PriLOSEC) 20 mg delayed release capsule    Dose/Frequency: Take 1 capsule (20 mg total) by mouth daily before breakfast    Quantity: Dispense: 90 capsule     Office:   [x] PCP/Provider - Amari Sheikh,    [] Speciality/Provider -     Does the patient have enough for 3 days?   [] Yes   [x] No - Send as HP to POD    Medication: zolpidem (AMBIEN) 10 mg tablet     Dose/Frequency: Take 1 tablet (10 mg total) by mouth daily at bedtime as needed for sleep     Quantity: Dispense: 30 tablet     Pharmacy: [facility]/pharmacy #[id number] [place] [address] [phone]     Office:   [x] PCP/Provider - Emilee Sheikh MD   [] Speciality/Provider -     Does the patient have enough for 3 days?   [] Yes   [x] No - Send as HP to POD

## 2024-09-03 NOTE — TELEPHONE ENCOUNTER
Called Patient he states the Pharmacy wouldn't give his Celexa [facility] and they stated that on 8/6/2024 Patient  a 90 day supply of 270 pills. Called patient back made him aware.

## 2024-09-03 NOTE — TELEPHONE ENCOUNTER
Patient call because his pharmacy [facility] does not want to refill the Citalopram 10mg. He said they giving him a hard time for this medication. Thank you

## 2024-09-05 ENCOUNTER — TELEPHONE (OUTPATIENT)
Age: 39
End: 2024-09-05

## 2024-09-05 NOTE — TELEPHONE ENCOUNTER
Pt called to check the status of med refill for zolpidem (AMBIEN) 10 mg tablet . Pt saw on his [facility] annette that the other two meds he requested refills for are filled, but no the zolpidem. I advise pt to call [facility] directly as the rx was sent on 8/29, to be started on 9/4.

## 2024-10-13 DIAGNOSIS — G47.9 SLEEP DISTURBANCE: Chronic | ICD-10-CM

## 2024-10-13 NOTE — TELEPHONE ENCOUNTER
Medication Refill Request     Name zolpidem (AMBIEN) 10 mg tablet    Dose/Frequency Take 1 tablet (10 mg total) by mouth daily at bedtime as needed for sleep   Quantity 30  Verified pharmacy   [x]  Verified ordering Provider   [x]  Does patient have enough for the next 3 days? Yes [] No [x]

## 2024-10-15 RX ORDER — ZOLPIDEM TARTRATE 10 MG/1
10 TABLET ORAL
Qty: 30 TABLET | Refills: 0 | Status: SHIPPED | OUTPATIENT
Start: 2024-10-15 | End: 2024-11-14 | Stop reason: SDUPTHER

## 2024-10-15 NOTE — TELEPHONE ENCOUNTER
Controlled Substance Review    [place] or [place]  reviewed: No red flags were identified; safe to proceed with prescription..

## 2024-11-14 DIAGNOSIS — G47.9 SLEEP DISTURBANCE: Chronic | ICD-10-CM

## 2024-11-14 NOTE — TELEPHONE ENCOUNTER
Medication: Zolpidem     Dose/Frequency: 10 mg     Quantity: 30    Pharmacy: [facility]     Office:   [x] PCP/Provider -   [] Speciality/Provider -     Does the patient have enough for 3 days?   [] Yes   [x] No - Send as HP to POD

## 2024-11-15 RX ORDER — ZOLPIDEM TARTRATE 10 MG/1
10 TABLET ORAL
Qty: 30 TABLET | Refills: 0 | Status: SHIPPED | OUTPATIENT
Start: 2024-11-15

## 2024-11-28 DIAGNOSIS — I21.3 STEMI (ST ELEVATION MYOCARDIAL INFARCTION) (HCC): ICD-10-CM

## 2024-11-29 RX ORDER — ATORVASTATIN CALCIUM 80 MG/1
80 TABLET, FILM COATED ORAL EVERY EVENING
Qty: 30 TABLET | Refills: 0 | Status: SHIPPED | OUTPATIENT
Start: 2024-11-29

## 2024-11-29 RX ORDER — METOPROLOL SUCCINATE 25 MG/1
25 TABLET, EXTENDED RELEASE ORAL DAILY
Qty: 90 TABLET | Refills: 1 | Status: SHIPPED | OUTPATIENT
Start: 2024-11-29 | End: 2025-11-24

## 2024-11-30 DIAGNOSIS — Z95.5 HISTORY OF CORONARY ARTERY STENT PLACEMENT: ICD-10-CM

## 2024-11-30 DIAGNOSIS — E78.00 PURE HYPERCHOLESTEROLEMIA: Primary | ICD-10-CM

## 2024-11-30 NOTE — TELEPHONE ENCOUNTER
Please call patient to schedule an appointment. Labs have been ordered. Will not be able to continue providing refills until he gets labs done. Thank you

## 2024-12-02 NOTE — TELEPHONE ENCOUNTER
Attempted to contact pt. Left a voice message about scheduling an appointment and labs ordered, along with a call back number.

## 2024-12-16 DIAGNOSIS — G47.9 SLEEP DISTURBANCE: Chronic | ICD-10-CM

## 2024-12-16 NOTE — TELEPHONE ENCOUNTER
Medication: zolpidem (AMBIEN) 10 mg tablet     Dose/Frequency:     Take 1 tablet (10 mg total) by mouth daily at bedtime as needed for sleep       Quantity: 30 tablet     Pharmacy: [facility]/pharmacy #[id number] [place] [id number]     Office:   [x] PCP/Provider - Keanu Licona MD    [] Speciality/Provider -     Does the patient have enough for 3 days?   [] Yes   [x] No - Send as HP to POD

## 2024-12-16 NOTE — ASSESSMENT & PLAN NOTE
Chronic sleep disturbance  Discussed sleep hygiene  Has been on Ambien for 15 years  Controlled Substance Review  [place] or [place]  reviewed: No red flags were identified; safe to proceed with prescription  Karrie Nissen Attending Only

## 2024-12-17 NOTE — TELEPHONE ENCOUNTER
Reached out to patient and pt asked if it can be sent to his house (scripts) sent out with  this afternoon to be sent to the pt house

## 2024-12-18 RX ORDER — ZOLPIDEM TARTRATE 10 MG/1
10 TABLET ORAL
Qty: 30 TABLET | Refills: 0 | OUTPATIENT
Start: 2024-12-18

## 2024-12-18 NOTE — TELEPHONE ENCOUNTER
Please call patient to schedule an appointment. He needs to obtain blood work for his other medication refills and an appointment.

## 2025-02-06 DIAGNOSIS — K21.9 GASTROESOPHAGEAL REFLUX DISEASE, UNSPECIFIED WHETHER ESOPHAGITIS PRESENT: ICD-10-CM

## 2025-03-10 ENCOUNTER — TELEPHONE (OUTPATIENT)
Age: 40
End: 2025-03-10

## 2025-03-10 NOTE — TELEPHONE ENCOUNTER
Patient has annual wellness visit coming on 3/21/25 and is requesting bloodwork for the visit.    Please advise patient if orders have been places and any fasting is needed.

## 2025-03-11 NOTE — TELEPHONE ENCOUNTER
Called pt, left a VM advising him that labs were ordered, he can go ahead and get them done before his appointment.

## 2025-04-08 ENCOUNTER — OFFICE VISIT (OUTPATIENT)
Dept: FAMILY MEDICINE CLINIC | Facility: CLINIC | Age: 40
End: 2025-04-08
Payer: MEDICARE

## 2025-04-08 VITALS
OXYGEN SATURATION: 97 % | HEIGHT: 69 IN | SYSTOLIC BLOOD PRESSURE: 137 MMHG | WEIGHT: 185 LBS | DIASTOLIC BLOOD PRESSURE: 78 MMHG | BODY MASS INDEX: 27.4 KG/M2 | TEMPERATURE: 98.3 F | HEART RATE: 107 BPM

## 2025-04-08 DIAGNOSIS — K21.9 GASTROESOPHAGEAL REFLUX DISEASE, UNSPECIFIED WHETHER ESOPHAGITIS PRESENT: Chronic | ICD-10-CM

## 2025-04-08 DIAGNOSIS — Z00.00 MEDICARE ANNUAL WELLNESS VISIT, SUBSEQUENT: Primary | ICD-10-CM

## 2025-04-08 DIAGNOSIS — F32.A DEPRESSION, UNSPECIFIED DEPRESSION TYPE: Chronic | ICD-10-CM

## 2025-04-08 DIAGNOSIS — E78.00 PURE HYPERCHOLESTEROLEMIA: Chronic | ICD-10-CM

## 2025-04-08 DIAGNOSIS — Z11.59 NEED FOR HEPATITIS C SCREENING TEST: ICD-10-CM

## 2025-04-08 DIAGNOSIS — Z95.5 HISTORY OF CORONARY ARTERY STENT PLACEMENT: ICD-10-CM

## 2025-04-08 DIAGNOSIS — G47.9 SLEEP DISTURBANCE: Chronic | ICD-10-CM

## 2025-04-08 PROCEDURE — G0439 PPPS, SUBSEQ VISIT: HCPCS

## 2025-04-08 RX ORDER — HYDROXYZINE HYDROCHLORIDE 25 MG/1
25 TABLET, FILM COATED ORAL
Qty: 90 TABLET | Refills: 1 | Status: SHIPPED | OUTPATIENT
Start: 2025-04-08

## 2025-04-08 RX ORDER — OMEPRAZOLE 20 MG/1
20 CAPSULE, DELAYED RELEASE ORAL
Qty: 90 CAPSULE | Refills: 1 | Status: SHIPPED | OUTPATIENT
Start: 2025-04-08

## 2025-04-08 RX ORDER — ZOLPIDEM TARTRATE 10 MG/1
10 TABLET ORAL
Qty: 30 TABLET | Refills: 0 | Status: SHIPPED | OUTPATIENT
Start: 2025-04-08

## 2025-04-08 NOTE — ASSESSMENT & PLAN NOTE
Hx of STEMI S/P PCI/PASTORA x3 on 2023  Continue current regimen  Has not seen cardiology since 2023; emphasized the importance of following with them  Orders:    Ambulatory Referral to Cardiology; Future

## 2025-04-08 NOTE — ASSESSMENT & PLAN NOTE
Hx of ?insomnia for several years. Has been on Ambien for over 10 years. Reports atarax at bedtime has been effective  Continue current regimen  Referral for sleep medicine provided  Orders:    hydrOXYzine HCL (ATARAX) 25 mg tablet; Take 1 tablet (25 mg total) by mouth daily at bedtime    Ambulatory Referral to Sleep Medicine; Future    zolpidem (AMBIEN) 10 mg tablet; Take 1 tablet (10 mg total) by mouth daily at bedtime as needed for sleep

## 2025-04-08 NOTE — ASSESSMENT & PLAN NOTE
Lab Results   Component Value Date    CHOLESTEROL 272 (H) 04/25/2023    TRIG 79 04/26/2023    HDL 31 (L) 04/25/2023    LDLCALC 216 (H) 04/25/2023     No recent lipid panel. Advised to get updated lipid panel  Continue Lipitor 80 mg qd

## 2025-04-08 NOTE — ASSESSMENT & PLAN NOTE
Continue current regimen  Orders:    omeprazole (PriLOSEC) 20 mg delayed release capsule; Take 1 capsule (20 mg total) by mouth daily before breakfast

## 2025-04-08 NOTE — PROGRESS NOTES
Name: Rajat Winn      : 1985      MRN: [id number]  Encounter Provider: Roseanne Parra MD  Encounter Date: 2025   Encounter department: [facility]    :  Assessment & Plan  Medicare annual wellness visit, subsequent         History of coronary artery stent placement  Hx of STEMI S/P PCI/PASTORA x3 on   Continue current regimen  Has not seen cardiology since ; emphasized the importance of following with them  Orders:    Ambulatory Referral to Cardiology; Future    Pure hypercholesterolemia  Lab Results   Component Value Date    CHOLESTEROL 272 (H) 2023    TRIG 79 2023    HDL 31 (L) 2023    LDLCALC 216 (H) 2023     No recent lipid panel. Advised to get updated lipid panel  Continue Lipitor 80 mg qd       Sleep disturbance  Hx of ?insomnia for several years. Has been on Ambien for over 10 years. Reports atarax at bedtime has been effective  Continue current regimen  Referral for sleep medicine provided  Orders:    hydrOXYzine HCL (ATARAX) 25 mg tablet; Take 1 tablet (25 mg total) by mouth daily at bedtime    Ambulatory Referral to Sleep Medicine; Future    zolpidem (AMBIEN) 10 mg tablet; Take 1 tablet (10 mg total) by mouth daily at bedtime as needed for sleep    Depression, unspecified depression type  Continue Celexa         Gastroesophageal reflux disease, unspecified whether esophagitis present  Continue current regimen  Orders:    omeprazole (PriLOSEC) 20 mg delayed release capsule; Take 1 capsule (20 mg total) by mouth daily before breakfast    Need for hepatitis C screening test    Orders:    Hepatitis C antibody; Future             Preventive health issues were discussed with patient, and age appropriate screening tests were ordered as noted in patient's After Visit Summary. Personalized health advice and appropriate referrals for health education or preventive services given if needed, as noted in patient's After Visit  Summary.    History of Present Illness         Patient Care Team:  Roseanne Parra MD as PCP - General (Family Medicine)    Review of Systems   Respiratory:  Negative for shortness of breath.    Cardiovascular:  Negative for chest pain and palpitations.   Gastrointestinal:  Negative for abdominal pain.   Neurological:  Negative for dizziness.   Psychiatric/Behavioral:  Positive for sleep disturbance.      Annual Wellness Visit Questionnaire       Health Risk Assessment:   Patient rates overall health as very good. Patient feels that their physical health rating is much better. Patient is very satisfied with their life. Eyesight was rated as same. Hearing was rated as same. Patient feels that their emotional and mental health rating is same. Patients states they are sometimes angry. Patient states they are never, rarely unusually tired/fatigued. Pain experienced in the last 7 days has been none. Patient states that he has experienced no weight loss or gain in last 6 months.     Fall Risk Screening:   In the past year, patient has experienced: no history of falling in past year      Home Safety:  Patient does not have trouble with stairs inside or outside of their home. Patient has working smoke alarms and has working carbon monoxide detector. Home safety hazards include: none.     Nutrition:   Current diet is Regular.     Medications:   Patient is not currently taking any over-the-counter supplements. Patient is able to manage medications.     Activities of Daily Living (ADLs)/Instrumental Activities of Daily Living (IADLs):   Walk and transfer into and out of bed and chair?: Yes  Dress and groom yourself?: Yes    Bathe or shower yourself?: Yes    Feed yourself? Yes  Do your laundry/housekeeping?: Yes  Manage your money, pay your bills and track your expenses?: Yes  Make your own meals?: Yes    Do your own shopping?: Yes    Previous Hospitalizations:   Any hospitalizations or ED visits within the last 12  "months?: No      Preventive Screenings      Cardiovascular Screening:    General: History Lipid Disorder      Colorectal Cancer Screening:     General: Screening Not Indicated      Prostate Cancer Screening:    General: Screening Not Indicated      Osteoporosis Screening:    General: Screening Not Indicated      Abdominal Aortic Aneurysm (AAA) Screening:        General: Screening Not Indicated      Lung Cancer Screening:     General: Screening Not Indicated    Immunizations:  - Immunizations due: Influenza and Prevnar 20    Screening, Brief Intervention, and Referral to Treatment (SBIRT)     Screening      Single Item Drug Screening:  How often have you used an illegal drug (including marijuana) or a prescription medication for non-medical reasons in the past year? never    Single Item Drug Screen Score: 0  Interpretation: Negative screen for possible drug use disorder    Social Drivers of Health     Financial Resource Strain: Low Risk  (3/3/2023)    Overall Financial Resource Strain (CARDIA)     Difficulty of Paying Living Expenses: Not hard at all   Food Insecurity: No Food Insecurity (4/8/2025)    Hunger Vital Sign     Worried About Running Out of Food in the Last Year: Never true     Ran Out of Food in the Last Year: Never true   Transportation Needs: No Transportation Needs (4/8/2025)    PRAPARE - Transportation     Lack of Transportation (Medical): No     Lack of Transportation (Non-Medical): No   Housing Stability: Low Risk  (4/8/2025)    Housing Stability Vital Sign     Unable to Pay for Housing in the Last Year: No     Number of Times Moved in the Last Year: 0     Homeless in the Last Year: No   Utilities: Not At Risk (4/8/2025)    [facility] Utilities     Threatened with loss of utilities: No     No results found.    Objective   /78 (BP Location: Right arm, Patient Position: Sitting, Cuff Size: Standard)   Pulse (!) 107   Temp 98.3 °F (36.8 °C) (Temporal)   Ht 5' 9.49\" (1.765 m)   Wt 83.9 kg (185 lb)   " SpO2 97%   BMI 26.94 kg/m²       Physical Exam  Constitutional:       General: He is not in acute distress.     Appearance: Normal appearance. He is not ill-appearing.   HENT:      Head: Normocephalic and atraumatic.      Nose: Nose normal.   Cardiovascular:      Rate and Rhythm: Normal rate and regular rhythm.      Pulses: Normal pulses.      Heart sounds: Normal heart sounds.   Pulmonary:      Effort: Pulmonary effort is normal. No respiratory distress.      Breath sounds: Normal breath sounds. No wheezing.   Abdominal:      General: Abdomen is flat. Bowel sounds are normal. There is no distension.      Palpations: Abdomen is soft.      Tenderness: There is no abdominal tenderness.   Musculoskeletal:      Cervical back: Neck supple.   Lymphadenopathy:      Cervical: No cervical adenopathy.   Skin:     General: Skin is warm.      Capillary Refill: Capillary refill takes less than 2 seconds.   Neurological:      Mental Status: He is alert and oriented to person, place, and time.

## 2025-04-30 DIAGNOSIS — F32.A DEPRESSION, UNSPECIFIED DEPRESSION TYPE: ICD-10-CM

## 2025-04-30 NOTE — TELEPHONE ENCOUNTER
Medication:     citalopram (CeleXA) 10 mg tablet       Dose/Frequency: Take 3 tablets (30 mg total) by mouth daily     Quantity: 270    Pharmacy: [facility]    Office:   [] PCP/Provider -   [] Speciality/Provider -     Does the patient have enough for 3 days?   [x] Yes   [] No - Send as HP to POD

## 2025-05-01 RX ORDER — CITALOPRAM HYDROBROMIDE 10 MG/1
30 TABLET ORAL DAILY
Qty: 270 TABLET | Refills: 1 | Status: SHIPPED | OUTPATIENT
Start: 2025-05-01

## 2025-05-20 DIAGNOSIS — F32.A DEPRESSION, UNSPECIFIED DEPRESSION TYPE: ICD-10-CM

## 2025-05-20 DIAGNOSIS — G47.9 SLEEP DISTURBANCE: Chronic | ICD-10-CM

## 2025-05-20 DIAGNOSIS — I21.3 STEMI (ST ELEVATION MYOCARDIAL INFARCTION) (HCC): ICD-10-CM

## 2025-05-21 RX ORDER — CITALOPRAM HYDROBROMIDE 10 MG/1
30 TABLET ORAL DAILY
Qty: 270 TABLET | Refills: 1 | OUTPATIENT
Start: 2025-05-21

## 2025-05-21 RX ORDER — HYDROXYZINE HYDROCHLORIDE 25 MG/1
25 TABLET, FILM COATED ORAL
Qty: 90 TABLET | Refills: 1 | OUTPATIENT
Start: 2025-05-21

## 2025-05-21 RX ORDER — ATORVASTATIN CALCIUM 80 MG/1
80 TABLET, FILM COATED ORAL EVERY EVENING
Qty: 30 TABLET | Refills: 0 | Status: SHIPPED | OUTPATIENT
Start: 2025-05-21

## 2025-05-21 NOTE — TELEPHONE ENCOUNTER
Medication Refill Request       Medication:   atorvastatin (LIPITOR) 80 mg tablet     Dose/Frequency: TAKE 1 TABLET BY MOUTH EVERY EVENING     Quantity: 30    Pharmacy: [facility]     Office:   [x] PCP/Provider -   [] Specialty/Provider -     Does the patient have enough for 3 days?   [x] Yes   [] No - Send as HP to POD    Is the patient completely out of the medication or does not have enough until the next business day?  [] Yes - send to Call Hub  [x] No - Send as HP to POD    Medication Refill Request       Medication: hydrOXYzine HCL (ATARAX) 25 mg tablet [     Dose/Frequency: Take 1 tablet (25 mg total) by mouth daily at bedtime     Quantity: 90    Pharmacy: [facility]     Office:   [x] PCP/Provider -   [] Specialty/Provider -     Does the patient have enough for 3 days?   [x] Yes   [] No - Send as HP to POD    Is the patient completely out of the medication or does not have enough until the next business day?  [] Yes - send to Call Hub  [x] No - Send as HP to POD    Medication Refill Request       Medication: citalopram (CeleXA) 10 mg tablet     Dose/Frequency:  Take 3 tablets (30 mg total) by mouth daily     Quantity: 270    Pharmacy: [facility]     Office:   [x] PCP/Provider -   [] Specialty/Provider -     Does the patient have enough for 3 days?   [x] Yes   [] No - Send as HP to POD    Is the patient completely out of the medication or does not have enough until the next business day?  [] Yes - send to Call Hub  [x] No - Send as HP to POD

## 2025-05-30 ENCOUNTER — APPOINTMENT (OUTPATIENT)
Dept: LAB | Facility: HOSPITAL | Age: 40
End: 2025-05-30
Payer: MEDICARE

## 2025-05-30 ENCOUNTER — RESULTS FOLLOW-UP (OUTPATIENT)
Dept: OTHER | Facility: HOSPITAL | Age: 40
End: 2025-05-30

## 2025-05-30 DIAGNOSIS — Z95.5 HISTORY OF CORONARY ARTERY STENT PLACEMENT: ICD-10-CM

## 2025-05-30 DIAGNOSIS — G47.9 SLEEP DISTURBANCE: Chronic | ICD-10-CM

## 2025-05-30 DIAGNOSIS — Z11.59 NEED FOR HEPATITIS C SCREENING TEST: ICD-10-CM

## 2025-05-30 DIAGNOSIS — E78.00 PURE HYPERCHOLESTEROLEMIA: ICD-10-CM

## 2025-05-30 LAB
ALBUMIN SERPL BCG-MCNC: 4.8 G/DL (ref 3.5–5)
ALP SERPL-CCNC: 73 U/L (ref 34–104)
ALT SERPL W P-5'-P-CCNC: 28 U/L (ref 7–52)
ANION GAP SERPL CALCULATED.3IONS-SCNC: 10 MMOL/L (ref 4–13)
AST SERPL W P-5'-P-CCNC: 16 U/L (ref 13–39)
BILIRUB SERPL-MCNC: 0.7 MG/DL (ref 0.2–1)
BUN SERPL-MCNC: 18 MG/DL (ref 5–25)
CALCIUM SERPL-MCNC: 9.9 MG/DL (ref 8.4–10.2)
CHLORIDE SERPL-SCNC: 100 MMOL/L (ref 96–108)
CHOLEST SERPL-MCNC: 344 MG/DL (ref ?–200)
CO2 SERPL-SCNC: 26 MMOL/L (ref 21–32)
CREAT SERPL-MCNC: 1.16 MG/DL (ref 0.6–1.3)
GFR SERPL CREATININE-BSD FRML MDRD: 78 ML/MIN/1.73SQ M
GLUCOSE P FAST SERPL-MCNC: 97 MG/DL (ref 65–99)
HCV AB SER QL: NORMAL
HDLC SERPL-MCNC: 43 MG/DL
LDLC SERPL DIRECT ASSAY-MCNC: 196 MG/DL (ref 0–100)
POTASSIUM SERPL-SCNC: 4 MMOL/L (ref 3.5–5.3)
PROT SERPL-MCNC: 7.9 G/DL (ref 6.4–8.4)
SODIUM SERPL-SCNC: 136 MMOL/L (ref 135–147)
TRIGL SERPL-MCNC: 599 MG/DL (ref ?–150)

## 2025-05-30 PROCEDURE — 86803 HEPATITIS C AB TEST: CPT

## 2025-05-30 PROCEDURE — 36415 COLL VENOUS BLD VENIPUNCTURE: CPT

## 2025-05-30 PROCEDURE — 80061 LIPID PANEL: CPT

## 2025-05-30 PROCEDURE — 80053 COMPREHEN METABOLIC PANEL: CPT

## 2025-05-30 PROCEDURE — 83721 ASSAY OF BLOOD LIPOPROTEIN: CPT

## 2025-05-30 NOTE — TELEPHONE ENCOUNTER
Called to relay drs message.    Pt also mentioned he needed a refill on sleep and depression medications.     ----- Message from Roseanne Parra MD sent at 5/30/2025  2:17 PM EDT -----  Lipid panel grossly uncontrolled. Continue Lipitor 80 mg daily.  Patient needs to follow up with cardiology, especially in setting of history of STEMI.  Please call patient and advise him that is   imperative for him to follow-up with cardiology.  ----- Message -----  From: Lab, Background User  Sent: 5/30/2025  11:52 AM EDT  To: Roseanne Parra MD

## 2025-06-02 RX ORDER — ZOLPIDEM TARTRATE 10 MG/1
10 TABLET ORAL
Qty: 30 TABLET | Refills: 0 | Status: SHIPPED | OUTPATIENT
Start: 2025-06-02

## 2025-06-26 DIAGNOSIS — I21.3 STEMI (ST ELEVATION MYOCARDIAL INFARCTION) (HCC): ICD-10-CM

## 2025-06-26 RX ORDER — ATORVASTATIN CALCIUM 80 MG/1
80 TABLET, FILM COATED ORAL EVERY EVENING
Qty: 90 TABLET | Refills: 1 | Status: SHIPPED | OUTPATIENT
Start: 2025-06-26

## 2025-07-08 DIAGNOSIS — G47.9 SLEEP DISTURBANCE: Chronic | ICD-10-CM

## 2025-07-08 NOTE — TELEPHONE ENCOUNTER
Medication: zolpidem (AMBIEN) 10 mg tablet     Dose/Frequency: TAKE 1 TABLET BY MOUTH DAILY AT BEDTIME AS NEEDED FOR SLEEP     Quantity: 30 tablet    Pharmacy: [facility]/pharmacy #[id number] - [place] [address]     Office:   [x] PCP/Provider -   [] Speciality/Provider -     Does the patient have enough for 3 days?   [x] Yes   [] No - Send as HP to POD

## 2025-07-10 RX ORDER — ZOLPIDEM TARTRATE 10 MG/1
10 TABLET ORAL
Qty: 30 TABLET | Refills: 0 | Status: SHIPPED | OUTPATIENT
Start: 2025-07-10

## 2025-07-10 NOTE — TELEPHONE ENCOUNTER
Patient called in regards to Zolpidem refill.  Informed waiting for provider.  Please call once sent to pharmacy.

## 2025-08-21 ENCOUNTER — TELEPHONE (OUTPATIENT)
Age: 40
End: 2025-08-21

## 2025-08-21 DIAGNOSIS — G47.9 SLEEP DISTURBANCE: Chronic | ICD-10-CM

## 2025-08-21 RX ORDER — ZOLPIDEM TARTRATE 10 MG/1
10 TABLET ORAL
Qty: 30 TABLET | Refills: 0 | Status: CANCELLED | OUTPATIENT
Start: 2025-08-21

## 2025-08-26 RX ORDER — ZOLPIDEM TARTRATE 10 MG/1
10 TABLET ORAL
Qty: 30 TABLET | Refills: 0 | Status: SHIPPED | OUTPATIENT
Start: 2025-08-26

## (undated) DEVICE — CATH IVUS EAGLE EYE PLATINUM 5FR 150CM

## (undated) DEVICE — CATH GUIDE LAUNCHER 6FR EBU 3.5

## (undated) DEVICE — GUIDELINER CATHETERS ARE INTENDED TO BE USED IN CONJUNCTION WITH GUIDE CATHETERS TO ACCESS DISCRETE REGIONS OF THE CORONARY AND/OR PERIPHERAL VASCULATURE, AND TO FACILITATE PLACEMENT OF INTERVENTIONAL DEVICES.: Brand: GUIDELINER® V3 CATHETER

## (undated) DEVICE — NC TREK NEO™ CORONARY DILATATION CATHETER 3.50 MM X 15 MM / RAPID-EXCHANGE: Brand: NC TREK NEO™

## (undated) DEVICE — MICROPUNCTURE INTRODUCER SET SILHOUETTE TRANSITIONLESS PUSH-PLUS DESIGN - STIFFENED CANNULA WITH NITINOL WIRE GUIDE: Brand: MICROPUNCTURE

## (undated) DEVICE — CATH GUIDE LAUNCHER 6FR JL 4.0

## (undated) DEVICE — Device: Brand: ASAHI SILVERWAY

## (undated) DEVICE — RUNTHROUGH NS EXTRA FLOPPY PTCA GUIDEWIRE: Brand: RUNTHROUGH

## (undated) DEVICE — RADIFOCUS OPTITORQUE ANGIOGRAPHIC CATHETER: Brand: OPTITORQUE

## (undated) DEVICE — NC TREK NEO™ CORONARY DILATATION CATHETER 2.75 MM X 12 MM / RAPID-EXCHANGE: Brand: NC TREK NEO™

## (undated) DEVICE — Device: Brand: PROWATER

## (undated) DEVICE — TR BAND RADIAL ARTERY COMPRESSION DEVICE: Brand: TR BAND

## (undated) DEVICE — GLIDESHEATH SLENDER STAINLESS STEEL KIT: Brand: GLIDESHEATH SLENDER

## (undated) DEVICE — PINNACLE INTRODUCER SHEATH: Brand: PINNACLE

## (undated) DEVICE — DGW .035 FC J3MM 260CM TEF: Brand: EMERALD

## (undated) DEVICE — TREK CORONARY DILATATION CATHETER 2.50 MM X 15 MM / RAPID-EXCHANGE: Brand: TREK

## (undated) DEVICE — CATH GUIDE LAUNCHER 6FR JR4 110CM

## (undated) DEVICE — RADIFOCUS GLIDEWIRE: Brand: GLIDEWIRE